# Patient Record
Sex: FEMALE | Race: WHITE | NOT HISPANIC OR LATINO | Employment: OTHER | URBAN - METROPOLITAN AREA
[De-identification: names, ages, dates, MRNs, and addresses within clinical notes are randomized per-mention and may not be internally consistent; named-entity substitution may affect disease eponyms.]

---

## 2023-03-24 DIAGNOSIS — F98.8 ATTENTION DEFICIT DISORDER (ADD) WITHOUT HYPERACTIVITY: Primary | ICD-10-CM

## 2023-03-24 DIAGNOSIS — E28.39 HYPOGONADISM, OVARIAN: ICD-10-CM

## 2023-03-24 RX ORDER — ESTERIFIED ESTROGEN AND METHYLTESTOSTERONE .625; 1.25 MG/1; MG/1
1 TABLET ORAL DAILY
COMMUNITY
End: 2023-03-24 | Stop reason: SDUPTHER

## 2023-03-24 RX ORDER — ESTERIFIED ESTROGEN AND METHYLTESTOSTERONE .625; 1.25 MG/1; MG/1
1 TABLET ORAL DAILY
Qty: 90 TABLET | Refills: 1 | Status: SHIPPED | OUTPATIENT
Start: 2023-03-24 | End: 2023-03-28 | Stop reason: SDUPTHER

## 2023-03-24 RX ORDER — ESCITALOPRAM OXALATE 20 MG/1
20 TABLET ORAL DAILY
COMMUNITY
End: 2023-07-12 | Stop reason: SDUPTHER

## 2023-03-24 RX ORDER — DEXTROAMPHETAMINE SACCHARATE, AMPHETAMINE ASPARTATE, DEXTROAMPHETAMINE SULFATE AND AMPHETAMINE SULFATE 7.5; 7.5; 7.5; 7.5 MG/1; MG/1; MG/1; MG/1
1 TABLET ORAL 2 TIMES DAILY
COMMUNITY
End: 2023-03-24 | Stop reason: SDUPTHER

## 2023-03-24 RX ORDER — METHYLPREDNISOLONE 4 MG/1
TABLET ORAL
COMMUNITY
Start: 2022-08-02 | End: 2023-09-11 | Stop reason: ALTCHOICE

## 2023-03-24 RX ORDER — ATENOLOL 50 MG/1
50 TABLET ORAL 2 TIMES DAILY
COMMUNITY
End: 2023-07-12 | Stop reason: SDUPTHER

## 2023-03-24 RX ORDER — VENLAFAXINE 37.5 MG/1
37.5 TABLET ORAL DAILY
COMMUNITY
End: 2023-09-11 | Stop reason: SDUPTHER

## 2023-03-24 RX ORDER — NAPROXEN 500 MG/1
1 TABLET ORAL 2 TIMES DAILY PRN
COMMUNITY
Start: 2022-12-20 | End: 2023-09-11 | Stop reason: ALTCHOICE

## 2023-03-24 RX ORDER — ZOLPIDEM TARTRATE 12.5 MG/1
12.5 TABLET, FILM COATED, EXTENDED RELEASE ORAL NIGHTLY
COMMUNITY
End: 2023-06-26 | Stop reason: SDUPTHER

## 2023-03-24 RX ORDER — CYCLOBENZAPRINE HCL 5 MG
1 TABLET ORAL 2 TIMES DAILY
COMMUNITY
Start: 2023-01-09 | End: 2023-09-11 | Stop reason: ALTCHOICE

## 2023-03-24 RX ORDER — LEVOTHYROXINE SODIUM 75 UG/1
75 TABLET ORAL DAILY
COMMUNITY
End: 2023-08-23 | Stop reason: SDUPTHER

## 2023-03-24 RX ORDER — DEXTROAMPHETAMINE SACCHARATE, AMPHETAMINE ASPARTATE, DEXTROAMPHETAMINE SULFATE AND AMPHETAMINE SULFATE 7.5; 7.5; 7.5; 7.5 MG/1; MG/1; MG/1; MG/1
1 TABLET ORAL 2 TIMES DAILY
Qty: 60 TABLET | Refills: 0 | Status: SHIPPED | OUTPATIENT
Start: 2023-03-24 | End: 2023-03-28 | Stop reason: SDUPTHER

## 2023-03-24 RX ORDER — MULTIVITAMIN
TABLET ORAL
COMMUNITY

## 2023-03-28 DIAGNOSIS — E28.39 HYPOGONADISM, OVARIAN: ICD-10-CM

## 2023-03-28 DIAGNOSIS — F98.8 ATTENTION DEFICIT DISORDER (ADD) WITHOUT HYPERACTIVITY: ICD-10-CM

## 2023-03-29 RX ORDER — DEXTROAMPHETAMINE SACCHARATE, AMPHETAMINE ASPARTATE, DEXTROAMPHETAMINE SULFATE AND AMPHETAMINE SULFATE 7.5; 7.5; 7.5; 7.5 MG/1; MG/1; MG/1; MG/1
1 TABLET ORAL 2 TIMES DAILY
Qty: 60 TABLET | Refills: 0 | Status: SHIPPED | OUTPATIENT
Start: 2023-03-29 | End: 2023-04-28 | Stop reason: SDUPTHER

## 2023-03-29 RX ORDER — ESTERIFIED ESTROGEN AND METHYLTESTOSTERONE .625; 1.25 MG/1; MG/1
1 TABLET ORAL DAILY
Qty: 90 TABLET | Refills: 1 | Status: SHIPPED | OUTPATIENT
Start: 2023-03-29 | End: 2023-09-11 | Stop reason: SDUPTHER

## 2023-04-28 DIAGNOSIS — F98.8 ATTENTION DEFICIT DISORDER (ADD) WITHOUT HYPERACTIVITY: ICD-10-CM

## 2023-04-28 RX ORDER — DEXTROAMPHETAMINE SACCHARATE, AMPHETAMINE ASPARTATE, DEXTROAMPHETAMINE SULFATE AND AMPHETAMINE SULFATE 7.5; 7.5; 7.5; 7.5 MG/1; MG/1; MG/1; MG/1
1 TABLET ORAL 2 TIMES DAILY
Qty: 60 TABLET | Refills: 0 | Status: SHIPPED | OUTPATIENT
Start: 2023-04-28 | End: 2023-05-30 | Stop reason: SDUPTHER

## 2023-05-30 DIAGNOSIS — F98.8 ATTENTION DEFICIT DISORDER (ADD) WITHOUT HYPERACTIVITY: ICD-10-CM

## 2023-05-30 RX ORDER — DEXTROAMPHETAMINE SACCHARATE, AMPHETAMINE ASPARTATE, DEXTROAMPHETAMINE SULFATE AND AMPHETAMINE SULFATE 7.5; 7.5; 7.5; 7.5 MG/1; MG/1; MG/1; MG/1
1 TABLET ORAL 2 TIMES DAILY
Qty: 60 TABLET | Refills: 0 | Status: SHIPPED | OUTPATIENT
Start: 2023-05-30 | End: 2023-06-30 | Stop reason: SDUPTHER

## 2023-06-26 DIAGNOSIS — F51.01 PRIMARY INSOMNIA: Primary | ICD-10-CM

## 2023-06-26 RX ORDER — ZOLPIDEM TARTRATE 12.5 MG/1
12.5 TABLET, FILM COATED, EXTENDED RELEASE ORAL NIGHTLY
Qty: 90 TABLET | Refills: 0 | Status: SHIPPED | OUTPATIENT
Start: 2023-06-26 | End: 2023-09-11 | Stop reason: SDUPTHER

## 2023-06-30 DIAGNOSIS — F98.8 ATTENTION DEFICIT DISORDER (ADD) WITHOUT HYPERACTIVITY: ICD-10-CM

## 2023-06-30 RX ORDER — DEXTROAMPHETAMINE SACCHARATE, AMPHETAMINE ASPARTATE, DEXTROAMPHETAMINE SULFATE AND AMPHETAMINE SULFATE 7.5; 7.5; 7.5; 7.5 MG/1; MG/1; MG/1; MG/1
1 TABLET ORAL 2 TIMES DAILY
Qty: 60 TABLET | Refills: 0 | Status: SHIPPED | OUTPATIENT
Start: 2023-06-30 | End: 2023-07-28 | Stop reason: SDUPTHER

## 2023-07-12 DIAGNOSIS — R45.86 MOOD CHANGE: Primary | ICD-10-CM

## 2023-07-12 DIAGNOSIS — I10 HYPERTENSION, UNSPECIFIED TYPE: ICD-10-CM

## 2023-07-13 RX ORDER — ATENOLOL 50 MG/1
50 TABLET ORAL 2 TIMES DAILY
Qty: 90 TABLET | Refills: 1 | Status: SHIPPED | OUTPATIENT
Start: 2023-07-13 | End: 2023-09-11 | Stop reason: SDUPTHER

## 2023-07-13 RX ORDER — ESCITALOPRAM OXALATE 20 MG/1
20 TABLET ORAL DAILY
Qty: 90 TABLET | Refills: 1 | Status: SHIPPED | OUTPATIENT
Start: 2023-07-13 | End: 2023-09-11 | Stop reason: SDUPTHER

## 2023-07-28 ENCOUNTER — TELEPHONE (OUTPATIENT)
Dept: PRIMARY CARE | Facility: CLINIC | Age: 68
End: 2023-07-28
Payer: MEDICARE

## 2023-07-28 DIAGNOSIS — F98.8 ATTENTION DEFICIT DISORDER (ADD) WITHOUT HYPERACTIVITY: ICD-10-CM

## 2023-07-31 RX ORDER — DEXTROAMPHETAMINE SACCHARATE, AMPHETAMINE ASPARTATE, DEXTROAMPHETAMINE SULFATE AND AMPHETAMINE SULFATE 7.5; 7.5; 7.5; 7.5 MG/1; MG/1; MG/1; MG/1
1 TABLET ORAL 2 TIMES DAILY
Qty: 60 TABLET | Refills: 0 | Status: SHIPPED | OUTPATIENT
Start: 2023-07-31 | End: 2023-08-29 | Stop reason: SDUPTHER

## 2023-08-02 ENCOUNTER — TELEPHONE (OUTPATIENT)
Dept: PRIMARY CARE | Facility: CLINIC | Age: 68
End: 2023-08-02
Payer: MEDICARE

## 2023-08-23 DIAGNOSIS — E03.9 ACQUIRED HYPOTHYROIDISM: Primary | ICD-10-CM

## 2023-08-23 RX ORDER — LEVOTHYROXINE SODIUM 75 UG/1
75 TABLET ORAL DAILY
Qty: 90 TABLET | Refills: 1 | Status: SHIPPED | OUTPATIENT
Start: 2023-08-23 | End: 2024-03-01

## 2023-08-29 DIAGNOSIS — F98.8 ATTENTION DEFICIT DISORDER (ADD) WITHOUT HYPERACTIVITY: ICD-10-CM

## 2023-08-29 RX ORDER — DEXTROAMPHETAMINE SACCHARATE, AMPHETAMINE ASPARTATE, DEXTROAMPHETAMINE SULFATE AND AMPHETAMINE SULFATE 7.5; 7.5; 7.5; 7.5 MG/1; MG/1; MG/1; MG/1
1 TABLET ORAL 2 TIMES DAILY
Qty: 60 TABLET | Refills: 0 | Status: SHIPPED | OUTPATIENT
Start: 2023-08-29 | End: 2023-09-11 | Stop reason: SDUPTHER

## 2023-09-11 ENCOUNTER — OFFICE VISIT (OUTPATIENT)
Dept: PRIMARY CARE | Facility: CLINIC | Age: 68
End: 2023-09-11
Payer: MEDICARE

## 2023-09-11 VITALS
DIASTOLIC BLOOD PRESSURE: 68 MMHG | OXYGEN SATURATION: 94 % | TEMPERATURE: 97.3 F | WEIGHT: 147.4 LBS | HEIGHT: 66 IN | RESPIRATION RATE: 16 BRPM | HEART RATE: 72 BPM | SYSTOLIC BLOOD PRESSURE: 120 MMHG | BODY MASS INDEX: 23.69 KG/M2

## 2023-09-11 DIAGNOSIS — F51.01 PRIMARY INSOMNIA: ICD-10-CM

## 2023-09-11 DIAGNOSIS — E28.39 HYPOGONADISM, OVARIAN: ICD-10-CM

## 2023-09-11 DIAGNOSIS — Z12.31 ENCOUNTER FOR SCREENING MAMMOGRAM FOR MALIGNANT NEOPLASM OF BREAST: ICD-10-CM

## 2023-09-11 DIAGNOSIS — I10 HYPERTENSION, UNSPECIFIED TYPE: ICD-10-CM

## 2023-09-11 DIAGNOSIS — Z00.00 WELL ADULT EXAM: Primary | ICD-10-CM

## 2023-09-11 DIAGNOSIS — R45.86 MOOD CHANGE: ICD-10-CM

## 2023-09-11 DIAGNOSIS — F98.8 ATTENTION DEFICIT DISORDER (ADD) WITHOUT HYPERACTIVITY: ICD-10-CM

## 2023-09-11 PROCEDURE — 1126F AMNT PAIN NOTED NONE PRSNT: CPT | Performed by: INTERNAL MEDICINE

## 2023-09-11 PROCEDURE — 99213 OFFICE O/P EST LOW 20 MIN: CPT | Performed by: INTERNAL MEDICINE

## 2023-09-11 PROCEDURE — 3078F DIAST BP <80 MM HG: CPT | Performed by: INTERNAL MEDICINE

## 2023-09-11 PROCEDURE — 3074F SYST BP LT 130 MM HG: CPT | Performed by: INTERNAL MEDICINE

## 2023-09-11 PROCEDURE — 1036F TOBACCO NON-USER: CPT | Performed by: INTERNAL MEDICINE

## 2023-09-11 PROCEDURE — 1159F MED LIST DOCD IN RCRD: CPT | Performed by: INTERNAL MEDICINE

## 2023-09-11 RX ORDER — VENLAFAXINE 37.5 MG/1
37.5 TABLET ORAL DAILY
Qty: 90 TABLET | Refills: 1 | Status: SHIPPED | OUTPATIENT
Start: 2023-09-11 | End: 2024-03-18

## 2023-09-11 RX ORDER — ATENOLOL 50 MG/1
50 TABLET ORAL 2 TIMES DAILY
Qty: 90 TABLET | Refills: 1 | Status: SHIPPED | OUTPATIENT
Start: 2023-09-11 | End: 2024-01-16 | Stop reason: SDUPTHER

## 2023-09-11 RX ORDER — ESCITALOPRAM OXALATE 20 MG/1
20 TABLET ORAL DAILY
Qty: 90 TABLET | Refills: 1 | Status: SHIPPED | OUTPATIENT
Start: 2023-09-11 | End: 2024-02-12 | Stop reason: SDUPTHER

## 2023-09-11 RX ORDER — ESTERIFIED ESTROGEN AND METHYLTESTOSTERONE .625; 1.25 MG/1; MG/1
1 TABLET ORAL DAILY
Qty: 90 TABLET | Refills: 1 | Status: SHIPPED | OUTPATIENT
Start: 2023-09-11 | End: 2024-04-03 | Stop reason: SDUPTHER

## 2023-09-11 RX ORDER — DEXTROAMPHETAMINE SACCHARATE, AMPHETAMINE ASPARTATE, DEXTROAMPHETAMINE SULFATE AND AMPHETAMINE SULFATE 7.5; 7.5; 7.5; 7.5 MG/1; MG/1; MG/1; MG/1
1 TABLET ORAL 2 TIMES DAILY
Qty: 60 TABLET | Refills: 0 | Status: SHIPPED | OUTPATIENT
Start: 2023-09-11 | End: 2023-11-06 | Stop reason: SDUPTHER

## 2023-09-11 RX ORDER — ZOLPIDEM TARTRATE 12.5 MG/1
12.5 TABLET, FILM COATED, EXTENDED RELEASE ORAL NIGHTLY
Qty: 90 TABLET | Refills: 0 | Status: SHIPPED | OUTPATIENT
Start: 2023-09-11 | End: 2024-01-08 | Stop reason: SDUPTHER

## 2023-09-11 ASSESSMENT — PAIN SCALES - GENERAL: PAINLEVEL: 0-NO PAIN

## 2023-09-11 ASSESSMENT — ENCOUNTER SYMPTOMS
DEPRESSION: 0
OCCASIONAL FEELINGS OF UNSTEADINESS: 0
LOSS OF SENSATION IN FEET: 0

## 2023-09-11 NOTE — PROGRESS NOTES
Patient is here for a 6 month f/up has no concerns   Needs 6 Rx refills   No Pain   Needs Referral for Mammogram and colonoscopy

## 2023-10-31 ENCOUNTER — ANCILLARY PROCEDURE (OUTPATIENT)
Dept: RADIOLOGY | Facility: CLINIC | Age: 68
End: 2023-10-31
Payer: MEDICARE

## 2023-10-31 DIAGNOSIS — Z12.31 ENCOUNTER FOR SCREENING MAMMOGRAM FOR MALIGNANT NEOPLASM OF BREAST: ICD-10-CM

## 2023-10-31 DIAGNOSIS — Z00.00 WELL ADULT EXAM: ICD-10-CM

## 2023-10-31 PROCEDURE — 77067 SCR MAMMO BI INCL CAD: CPT | Mod: BILATERAL PROCEDURE | Performed by: RADIOLOGY

## 2023-10-31 PROCEDURE — 77063 BREAST TOMOSYNTHESIS BI: CPT

## 2023-10-31 PROCEDURE — 77063 BREAST TOMOSYNTHESIS BI: CPT | Mod: BILATERAL PROCEDURE | Performed by: RADIOLOGY

## 2023-11-06 DIAGNOSIS — F98.8 ATTENTION DEFICIT DISORDER (ADD) WITHOUT HYPERACTIVITY: ICD-10-CM

## 2023-11-06 RX ORDER — DEXTROAMPHETAMINE SACCHARATE, AMPHETAMINE ASPARTATE, DEXTROAMPHETAMINE SULFATE AND AMPHETAMINE SULFATE 7.5; 7.5; 7.5; 7.5 MG/1; MG/1; MG/1; MG/1
1 TABLET ORAL 2 TIMES DAILY
Qty: 60 TABLET | Refills: 0 | Status: SHIPPED | OUTPATIENT
Start: 2023-11-06 | End: 2023-12-04 | Stop reason: SDUPTHER

## 2023-12-04 DIAGNOSIS — F98.8 ATTENTION DEFICIT DISORDER (ADD) WITHOUT HYPERACTIVITY: ICD-10-CM

## 2023-12-04 RX ORDER — DEXTROAMPHETAMINE SACCHARATE, AMPHETAMINE ASPARTATE, DEXTROAMPHETAMINE SULFATE AND AMPHETAMINE SULFATE 7.5; 7.5; 7.5; 7.5 MG/1; MG/1; MG/1; MG/1
1 TABLET ORAL 2 TIMES DAILY
Qty: 60 TABLET | Refills: 0 | Status: SHIPPED | OUTPATIENT
Start: 2023-12-04 | End: 2024-01-08 | Stop reason: SDUPTHER

## 2024-01-08 ENCOUNTER — TELEPHONE (OUTPATIENT)
Dept: PRIMARY CARE | Facility: CLINIC | Age: 69
End: 2024-01-08
Payer: MEDICARE

## 2024-01-08 DIAGNOSIS — F98.8 ATTENTION DEFICIT DISORDER (ADD) WITHOUT HYPERACTIVITY: ICD-10-CM

## 2024-01-08 DIAGNOSIS — F51.01 PRIMARY INSOMNIA: ICD-10-CM

## 2024-01-08 RX ORDER — DEXTROAMPHETAMINE SACCHARATE, AMPHETAMINE ASPARTATE, DEXTROAMPHETAMINE SULFATE AND AMPHETAMINE SULFATE 7.5; 7.5; 7.5; 7.5 MG/1; MG/1; MG/1; MG/1
1 TABLET ORAL 2 TIMES DAILY
Qty: 60 TABLET | Refills: 0 | Status: SHIPPED | OUTPATIENT
Start: 2024-01-08 | End: 2024-01-10 | Stop reason: SDUPTHER

## 2024-01-08 RX ORDER — ZOLPIDEM TARTRATE 12.5 MG/1
12.5 TABLET, FILM COATED, EXTENDED RELEASE ORAL NIGHTLY
Qty: 90 TABLET | Refills: 0 | Status: SHIPPED | OUTPATIENT
Start: 2024-01-08 | End: 2024-01-16 | Stop reason: SDUPTHER

## 2024-01-10 RX ORDER — DEXTROAMPHETAMINE SACCHARATE, AMPHETAMINE ASPARTATE, DEXTROAMPHETAMINE SULFATE AND AMPHETAMINE SULFATE 7.5; 7.5; 7.5; 7.5 MG/1; MG/1; MG/1; MG/1
1 TABLET ORAL 2 TIMES DAILY
Qty: 60 TABLET | Refills: 0 | Status: SHIPPED | OUTPATIENT
Start: 2024-01-10 | End: 2024-01-16 | Stop reason: SDUPTHER

## 2024-01-10 NOTE — TELEPHONE ENCOUNTER
VM:  [facility] does not have her Adderall.  I CB, LVM that she needs to advise us where to resend the Adderall Rx (that she needs to call [facility] to find out who has it) and that she needs to sched an apptmt.

## 2024-01-16 ENCOUNTER — OFFICE VISIT (OUTPATIENT)
Dept: PRIMARY CARE | Facility: CLINIC | Age: 69
End: 2024-01-16
Payer: MEDICARE

## 2024-01-16 VITALS
BODY MASS INDEX: 22.98 KG/M2 | OXYGEN SATURATION: 97 % | HEART RATE: 64 BPM | SYSTOLIC BLOOD PRESSURE: 130 MMHG | RESPIRATION RATE: 16 BRPM | WEIGHT: 143 LBS | TEMPERATURE: 97.3 F | DIASTOLIC BLOOD PRESSURE: 84 MMHG | HEIGHT: 66 IN

## 2024-01-16 DIAGNOSIS — I10 HYPERTENSION, UNSPECIFIED TYPE: ICD-10-CM

## 2024-01-16 DIAGNOSIS — Z51.81 ENCOUNTER FOR THERAPEUTIC DRUG LEVEL MONITORING: ICD-10-CM

## 2024-01-16 DIAGNOSIS — F98.8 ATTENTION DEFICIT DISORDER (ADD) WITHOUT HYPERACTIVITY: ICD-10-CM

## 2024-01-16 DIAGNOSIS — F51.01 PRIMARY INSOMNIA: ICD-10-CM

## 2024-01-16 PROCEDURE — 99213 OFFICE O/P EST LOW 20 MIN: CPT | Performed by: INTERNAL MEDICINE

## 2024-01-16 PROCEDURE — 80368 SEDATIVE HYPNOTICS: CPT

## 2024-01-16 PROCEDURE — 3075F SYST BP GE 130 - 139MM HG: CPT | Performed by: INTERNAL MEDICINE

## 2024-01-16 PROCEDURE — 80373 DRUG SCREENING TRAMADOL: CPT

## 2024-01-16 PROCEDURE — 80365 DRUG SCREENING OXYCODONE: CPT

## 2024-01-16 PROCEDURE — 80354 DRUG SCREENING FENTANYL: CPT

## 2024-01-16 PROCEDURE — 80346 BENZODIAZEPINES1-12: CPT

## 2024-01-16 PROCEDURE — 1159F MED LIST DOCD IN RCRD: CPT | Performed by: INTERNAL MEDICINE

## 2024-01-16 PROCEDURE — 1036F TOBACCO NON-USER: CPT | Performed by: INTERNAL MEDICINE

## 2024-01-16 PROCEDURE — 1126F AMNT PAIN NOTED NONE PRSNT: CPT | Performed by: INTERNAL MEDICINE

## 2024-01-16 PROCEDURE — 3079F DIAST BP 80-89 MM HG: CPT | Performed by: INTERNAL MEDICINE

## 2024-01-16 PROCEDURE — 80361 OPIATES 1 OR MORE: CPT

## 2024-01-16 PROCEDURE — 80358 DRUG SCREENING METHADONE: CPT

## 2024-01-16 RX ORDER — ATENOLOL 50 MG/1
50 TABLET ORAL 2 TIMES DAILY
Qty: 90 TABLET | Refills: 1 | Status: SHIPPED | OUTPATIENT
Start: 2024-01-16 | End: 2024-01-16

## 2024-01-16 RX ORDER — ATENOLOL 50 MG/1
50 TABLET ORAL 2 TIMES DAILY
Qty: 180 TABLET | Refills: 1 | Status: SHIPPED | OUTPATIENT
Start: 2024-01-16

## 2024-01-16 RX ORDER — DEXTROAMPHETAMINE SACCHARATE, AMPHETAMINE ASPARTATE, DEXTROAMPHETAMINE SULFATE AND AMPHETAMINE SULFATE 7.5; 7.5; 7.5; 7.5 MG/1; MG/1; MG/1; MG/1
1 TABLET ORAL 2 TIMES DAILY
Qty: 60 TABLET | Refills: 0 | Status: SHIPPED | OUTPATIENT
Start: 2024-01-16 | End: 2024-02-12 | Stop reason: SDUPTHER

## 2024-01-16 RX ORDER — ZOLPIDEM TARTRATE 12.5 MG/1
12.5 TABLET, FILM COATED, EXTENDED RELEASE ORAL NIGHTLY
Qty: 90 TABLET | Refills: 0 | Status: SHIPPED | OUTPATIENT
Start: 2024-01-16 | End: 2024-03-26 | Stop reason: SDUPTHER

## 2024-01-16 ASSESSMENT — PATIENT HEALTH QUESTIONNAIRE - PHQ9
4. FEELING TIRED OR HAVING LITTLE ENERGY: SEVERAL DAYS
5. POOR APPETITE OR OVEREATING: NOT AT ALL
SUM OF ALL RESPONSES TO PHQ9 QUESTIONS 1 AND 2: 2
9. THOUGHTS THAT YOU WOULD BE BETTER OFF DEAD, OR OF HURTING YOURSELF: NOT AT ALL
SUM OF ALL RESPONSES TO PHQ QUESTIONS 1-9: 4
10. IF YOU CHECKED OFF ANY PROBLEMS, HOW DIFFICULT HAVE THESE PROBLEMS MADE IT FOR YOU TO DO YOUR WORK, TAKE CARE OF THINGS AT HOME, OR GET ALONG WITH OTHER PEOPLE: SOMEWHAT DIFFICULT
6. FEELING BAD ABOUT YOURSELF - OR THAT YOU ARE A FAILURE OR HAVE LET YOURSELF OR YOUR FAMILY DOWN: NOT AT ALL
1. LITTLE INTEREST OR PLEASURE IN DOING THINGS: SEVERAL DAYS
2. FEELING DOWN, DEPRESSED OR HOPELESS: SEVERAL DAYS
3. TROUBLE FALLING OR STAYING ASLEEP OR SLEEPING TOO MUCH: SEVERAL DAYS
7. TROUBLE CONCENTRATING ON THINGS, SUCH AS READING THE NEWSPAPER OR WATCHING TELEVISION: NOT AT ALL
8. MOVING OR SPEAKING SO SLOWLY THAT OTHER PEOPLE COULD HAVE NOTICED. OR THE OPPOSITE, BEING SO FIGETY OR RESTLESS THAT YOU HAVE BEEN MOVING AROUND A LOT MORE THAN USUAL: NOT AT ALL

## 2024-01-16 ASSESSMENT — ANXIETY QUESTIONNAIRES
3. WORRYING TOO MUCH ABOUT DIFFERENT THINGS: NEARLY EVERY DAY
2. NOT BEING ABLE TO STOP OR CONTROL WORRYING: NEARLY EVERY DAY
1. FEELING NERVOUS, ANXIOUS, OR ON EDGE: MORE THAN HALF THE DAYS
IF YOU CHECKED OFF ANY PROBLEMS ON THIS QUESTIONNAIRE, HOW DIFFICULT HAVE THESE PROBLEMS MADE IT FOR YOU TO DO YOUR WORK, TAKE CARE OF THINGS AT HOME, OR GET ALONG WITH OTHER PEOPLE: NOT DIFFICULT AT ALL
5. BEING SO RESTLESS THAT IT IS HARD TO SIT STILL: MORE THAN HALF THE DAYS
6. BECOMING EASILY ANNOYED OR IRRITABLE: SEVERAL DAYS
7. FEELING AFRAID AS IF SOMETHING AWFUL MIGHT HAPPEN: NOT AT ALL
4. TROUBLE RELAXING: NEARLY EVERY DAY
GAD7 TOTAL SCORE: 14

## 2024-01-20 LAB
1OH-MIDAZOLAM UR CFM-MCNC: <25 NG/ML
6MAM UR CFM-MCNC: <25 NG/ML
7AMINOCLONAZEPAM UR CFM-MCNC: <25 NG/ML
A-OH ALPRAZ UR CFM-MCNC: <25 NG/ML
ALPRAZ UR CFM-MCNC: <25 NG/ML
CHLORDIAZEP UR CFM-MCNC: <25 NG/ML
CLONAZEPAM UR CFM-MCNC: <25 NG/ML
CODEINE UR CFM-MCNC: <50 NG/ML
DIAZEPAM UR CFM-MCNC: <25 NG/ML
EDDP UR CFM-MCNC: <25 NG/ML
FENTANYL UR CFM-MCNC: <2.5 NG/ML
HYDROCODONE CTO UR CFM-MCNC: <25 NG/ML
HYDROMORPHONE UR CFM-MCNC: <25 NG/ML
LORAZEPAM UR CFM-MCNC: <25 NG/ML
METHADONE UR CFM-MCNC: <25 NG/ML
MIDAZOLAM UR CFM-MCNC: <25 NG/ML
MORPHINE UR CFM-MCNC: <50 NG/ML
NORDIAZEPAM UR CFM-MCNC: <25 NG/ML
NORFENTANYL UR CFM-MCNC: <2.5 NG/ML
NORHYDROCODONE UR CFM-MCNC: <25 NG/ML
NOROXYCODONE UR CFM-MCNC: <25 NG/ML
NORTRAMADOL UR-MCNC: <50 NG/ML
OXAZEPAM UR CFM-MCNC: <25 NG/ML
OXYCODONE UR CFM-MCNC: <25 NG/ML
OXYMORPHONE UR CFM-MCNC: <25 NG/ML
TEMAZEPAM UR CFM-MCNC: <25 NG/ML
TRAMADOL UR CFM-MCNC: <50 NG/ML
ZOLPIDEM UR CFM-MCNC: >1000 NG/ML
ZOLPIDEM UR-MCNC: 121 NG/ML

## 2024-02-12 DIAGNOSIS — R45.86 MOOD CHANGE: ICD-10-CM

## 2024-02-12 DIAGNOSIS — F98.8 ATTENTION DEFICIT DISORDER (ADD) WITHOUT HYPERACTIVITY: ICD-10-CM

## 2024-02-12 RX ORDER — DEXTROAMPHETAMINE SACCHARATE, AMPHETAMINE ASPARTATE, DEXTROAMPHETAMINE SULFATE AND AMPHETAMINE SULFATE 7.5; 7.5; 7.5; 7.5 MG/1; MG/1; MG/1; MG/1
1 TABLET ORAL 2 TIMES DAILY
Qty: 60 TABLET | Refills: 0 | Status: SHIPPED | OUTPATIENT
Start: 2024-02-12 | End: 2024-03-13 | Stop reason: SDUPTHER

## 2024-02-12 RX ORDER — ESCITALOPRAM OXALATE 20 MG/1
20 TABLET ORAL DAILY
Qty: 90 TABLET | Refills: 1 | Status: SHIPPED | OUTPATIENT
Start: 2024-02-12 | End: 2024-04-03 | Stop reason: SDUPTHER

## 2024-02-29 DIAGNOSIS — E03.9 ACQUIRED HYPOTHYROIDISM: ICD-10-CM

## 2024-03-01 RX ORDER — LEVOTHYROXINE SODIUM 75 UG/1
75 TABLET ORAL DAILY
Qty: 90 TABLET | Refills: 1 | Status: SHIPPED | OUTPATIENT
Start: 2024-03-01

## 2024-03-11 DIAGNOSIS — R05.1 ACUTE COUGH: Primary | ICD-10-CM

## 2024-03-11 RX ORDER — AZITHROMYCIN 250 MG/1
TABLET, FILM COATED ORAL DAILY
COMMUNITY
End: 2024-03-11 | Stop reason: SDUPTHER

## 2024-03-11 RX ORDER — AZITHROMYCIN 250 MG/1
250 TABLET, FILM COATED ORAL DAILY
Qty: 6 TABLET | Refills: 0 | Status: SHIPPED | OUTPATIENT
Start: 2024-03-11

## 2024-03-13 DIAGNOSIS — F98.8 ATTENTION DEFICIT DISORDER (ADD) WITHOUT HYPERACTIVITY: ICD-10-CM

## 2024-03-13 RX ORDER — DEXTROAMPHETAMINE SACCHARATE, AMPHETAMINE ASPARTATE, DEXTROAMPHETAMINE SULFATE AND AMPHETAMINE SULFATE 7.5; 7.5; 7.5; 7.5 MG/1; MG/1; MG/1; MG/1
1 TABLET ORAL 2 TIMES DAILY
Qty: 60 TABLET | Refills: 0 | Status: SHIPPED | OUTPATIENT
Start: 2024-03-13 | End: 2024-04-12 | Stop reason: SDUPTHER

## 2024-03-18 DIAGNOSIS — R45.86 MOOD CHANGE: ICD-10-CM

## 2024-03-18 RX ORDER — VENLAFAXINE 37.5 MG/1
37.5 TABLET ORAL DAILY
Qty: 90 TABLET | Refills: 1 | Status: SHIPPED | OUTPATIENT
Start: 2024-03-18 | End: 2024-04-03 | Stop reason: SDUPTHER

## 2024-03-22 ENCOUNTER — TELEPHONE (OUTPATIENT)
Dept: PRIMARY CARE | Facility: CLINIC | Age: 69
End: 2024-03-22
Payer: MEDICARE

## 2024-03-22 ENCOUNTER — APPOINTMENT (OUTPATIENT)
Dept: RADIOLOGY | Facility: HOSPITAL | Age: 69
End: 2024-03-22
Payer: MEDICARE

## 2024-03-22 ENCOUNTER — APPOINTMENT (OUTPATIENT)
Dept: CARDIOLOGY | Facility: HOSPITAL | Age: 69
End: 2024-03-22
Payer: MEDICARE

## 2024-03-22 ENCOUNTER — HOSPITAL ENCOUNTER (EMERGENCY)
Facility: HOSPITAL | Age: 69
Discharge: HOME | End: 2024-03-22
Attending: EMERGENCY MEDICINE
Payer: MEDICARE

## 2024-03-22 VITALS
HEART RATE: 73 BPM | OXYGEN SATURATION: 98 % | BODY MASS INDEX: 23.38 KG/M2 | RESPIRATION RATE: 16 BRPM | HEIGHT: 66 IN | SYSTOLIC BLOOD PRESSURE: 169 MMHG | DIASTOLIC BLOOD PRESSURE: 72 MMHG | WEIGHT: 145.5 LBS | TEMPERATURE: 97 F

## 2024-03-22 DIAGNOSIS — R42 DIZZINESS: ICD-10-CM

## 2024-03-22 DIAGNOSIS — H61.21 IMPACTED CERUMEN OF RIGHT EAR: ICD-10-CM

## 2024-03-22 DIAGNOSIS — J20.9 BRONCHOSPASM WITH BRONCHITIS, ACUTE: Primary | ICD-10-CM

## 2024-03-22 LAB
ALBUMIN SERPL BCP-MCNC: 4.4 G/DL (ref 3.4–5)
ALP SERPL-CCNC: 67 U/L (ref 33–136)
ALT SERPL W P-5'-P-CCNC: 18 U/L (ref 7–45)
ANION GAP SERPL CALC-SCNC: 14 MMOL/L (ref 10–20)
AST SERPL W P-5'-P-CCNC: 17 U/L (ref 9–39)
ATRIAL RATE: 73 BPM
BASOPHILS # BLD AUTO: 0.05 X10*3/UL (ref 0–0.1)
BASOPHILS NFR BLD AUTO: 0.7 %
BILIRUB SERPL-MCNC: 0.3 MG/DL (ref 0–1.2)
BUN SERPL-MCNC: 22 MG/DL (ref 6–23)
CALCIUM SERPL-MCNC: 10.6 MG/DL (ref 8.6–10.3)
CARDIAC TROPONIN I PNL SERPL HS: 5 NG/L (ref 0–13)
CHLORIDE SERPL-SCNC: 101 MMOL/L (ref 98–107)
CO2 SERPL-SCNC: 28 MMOL/L (ref 21–32)
CREAT SERPL-MCNC: 1.18 MG/DL (ref 0.5–1.05)
EGFRCR SERPLBLD CKD-EPI 2021: 50 ML/MIN/1.73M*2
EOSINOPHIL # BLD AUTO: 0.21 X10*3/UL (ref 0–0.7)
EOSINOPHIL NFR BLD AUTO: 2.8 %
ERYTHROCYTE [DISTWIDTH] IN BLOOD BY AUTOMATED COUNT: 15 % (ref 11.5–14.5)
FLUAV RNA RESP QL NAA+PROBE: NOT DETECTED
FLUBV RNA RESP QL NAA+PROBE: NOT DETECTED
GLUCOSE SERPL-MCNC: 103 MG/DL (ref 74–99)
HCT VFR BLD AUTO: 37.2 % (ref 36–46)
HGB BLD-MCNC: 11.6 G/DL (ref 12–16)
IMM GRANULOCYTES # BLD AUTO: 0.01 X10*3/UL (ref 0–0.7)
IMM GRANULOCYTES NFR BLD AUTO: 0.1 % (ref 0–0.9)
LYMPHOCYTES # BLD AUTO: 1.83 X10*3/UL (ref 1.2–4.8)
LYMPHOCYTES NFR BLD AUTO: 24 %
MCH RBC QN AUTO: 24.6 PG (ref 26–34)
MCHC RBC AUTO-ENTMCNC: 31.2 G/DL (ref 32–36)
MCV RBC AUTO: 79 FL (ref 80–100)
MONOCYTES # BLD AUTO: 0.54 X10*3/UL (ref 0.1–1)
MONOCYTES NFR BLD AUTO: 7.1 %
NEUTROPHILS # BLD AUTO: 4.98 X10*3/UL (ref 1.2–7.7)
NEUTROPHILS NFR BLD AUTO: 65.3 %
NRBC BLD-RTO: ABNORMAL /100{WBCS}
P AXIS: 71 DEGREES
P OFFSET: 215 MS
P ONSET: 153 MS
PLATELET # BLD AUTO: 384 X10*3/UL (ref 150–450)
POTASSIUM SERPL-SCNC: 4.2 MMOL/L (ref 3.5–5.3)
PR INTERVAL: 146 MS
PROT SERPL-MCNC: 7.3 G/DL (ref 6.4–8.2)
Q ONSET: 226 MS
QRS COUNT: 12 BEATS
QRS DURATION: 152 MS
QT INTERVAL: 420 MS
QTC CALCULATION(BAZETT): 462 MS
QTC FREDERICIA: 448 MS
R AXIS: 156 DEGREES
RBC # BLD AUTO: 4.71 X10*6/UL (ref 4–5.2)
RSV RNA RESP QL NAA+PROBE: NOT DETECTED
SARS-COV-2 RNA RESP QL NAA+PROBE: NOT DETECTED
SODIUM SERPL-SCNC: 139 MMOL/L (ref 136–145)
T AXIS: 68 DEGREES
T OFFSET: 436 MS
VENTRICULAR RATE: 73 BPM
WBC # BLD AUTO: 7.6 X10*3/UL (ref 4.4–11.3)

## 2024-03-22 PROCEDURE — 69209 REMOVE IMPACTED EAR WAX UNI: CPT | Mod: RT

## 2024-03-22 PROCEDURE — 36415 COLL VENOUS BLD VENIPUNCTURE: CPT | Performed by: EMERGENCY MEDICINE

## 2024-03-22 PROCEDURE — 85025 COMPLETE CBC W/AUTO DIFF WBC: CPT | Performed by: EMERGENCY MEDICINE

## 2024-03-22 PROCEDURE — 2500000001 HC RX 250 WO HCPCS SELF ADMINISTERED DRUGS (ALT 637 FOR MEDICARE OP): Performed by: EMERGENCY MEDICINE

## 2024-03-22 PROCEDURE — 71046 X-RAY EXAM CHEST 2 VIEWS: CPT

## 2024-03-22 PROCEDURE — 2500000004 HC RX 250 GENERAL PHARMACY W/ HCPCS (ALT 636 FOR OP/ED): Performed by: EMERGENCY MEDICINE

## 2024-03-22 PROCEDURE — 99285 EMERGENCY DEPT VISIT HI MDM: CPT | Mod: 25

## 2024-03-22 PROCEDURE — 96374 THER/PROPH/DIAG INJ IV PUSH: CPT

## 2024-03-22 PROCEDURE — 80053 COMPREHEN METABOLIC PANEL: CPT | Performed by: EMERGENCY MEDICINE

## 2024-03-22 PROCEDURE — 94640 AIRWAY INHALATION TREATMENT: CPT

## 2024-03-22 PROCEDURE — 2500000002 HC RX 250 W HCPCS SELF ADMINISTERED DRUGS (ALT 637 FOR MEDICARE OP, ALT 636 FOR OP/ED): Performed by: EMERGENCY MEDICINE

## 2024-03-22 PROCEDURE — 70450 CT HEAD/BRAIN W/O DYE: CPT | Performed by: RADIOLOGY

## 2024-03-22 PROCEDURE — 70450 CT HEAD/BRAIN W/O DYE: CPT

## 2024-03-22 PROCEDURE — 87637 SARSCOV2&INF A&B&RSV AMP PRB: CPT | Performed by: EMERGENCY MEDICINE

## 2024-03-22 PROCEDURE — 84484 ASSAY OF TROPONIN QUANT: CPT | Performed by: EMERGENCY MEDICINE

## 2024-03-22 PROCEDURE — 71046 X-RAY EXAM CHEST 2 VIEWS: CPT | Performed by: RADIOLOGY

## 2024-03-22 PROCEDURE — 93005 ELECTROCARDIOGRAM TRACING: CPT

## 2024-03-22 RX ORDER — ALBUTEROL SULFATE 90 UG/1
1-2 AEROSOL, METERED RESPIRATORY (INHALATION) EVERY 6 HOURS PRN
Qty: 18 G | Refills: 0 | Status: SHIPPED | OUTPATIENT
Start: 2024-03-22 | End: 2024-04-21

## 2024-03-22 RX ORDER — MECLIZINE HYDROCHLORIDE 25 MG/1
25 TABLET ORAL 3 TIMES DAILY PRN
Qty: 30 TABLET | Refills: 0 | Status: SHIPPED | OUTPATIENT
Start: 2024-03-22 | End: 2024-04-01

## 2024-03-22 RX ORDER — IPRATROPIUM BROMIDE AND ALBUTEROL SULFATE 2.5; .5 MG/3ML; MG/3ML
3 SOLUTION RESPIRATORY (INHALATION) ONCE
Status: COMPLETED | OUTPATIENT
Start: 2024-03-22 | End: 2024-03-22

## 2024-03-22 RX ORDER — MECLIZINE HCL 25MG 25 MG/1
25 TABLET, CHEWABLE ORAL ONCE
Status: COMPLETED | OUTPATIENT
Start: 2024-03-22 | End: 2024-03-22

## 2024-03-22 RX ORDER — PREDNISONE 10 MG/1
50 TABLET ORAL DAILY
Qty: 25 TABLET | Refills: 0 | Status: SHIPPED | OUTPATIENT
Start: 2024-03-23 | End: 2024-03-28

## 2024-03-22 RX ADMIN — IPRATROPIUM BROMIDE AND ALBUTEROL SULFATE 3 ML: 2.5; .5 SOLUTION RESPIRATORY (INHALATION) at 12:16

## 2024-03-22 RX ADMIN — METHYLPREDNISOLONE SODIUM SUCCINATE 125 MG: 125 INJECTION, POWDER, FOR SOLUTION INTRAMUSCULAR; INTRAVENOUS at 12:22

## 2024-03-22 RX ADMIN — MECLIZINE HYDROCHLORIDE 25 MG: 25 TABLET, CHEWABLE ORAL at 12:53

## 2024-03-22 ASSESSMENT — PAIN DESCRIPTION - LOCATION: LOCATION: CHEST

## 2024-03-22 ASSESSMENT — COLUMBIA-SUICIDE SEVERITY RATING SCALE - C-SSRS
6. HAVE YOU EVER DONE ANYTHING, STARTED TO DO ANYTHING, OR PREPARED TO DO ANYTHING TO END YOUR LIFE?: NO
2. HAVE YOU ACTUALLY HAD ANY THOUGHTS OF KILLING YOURSELF?: NO
1. IN THE PAST MONTH, HAVE YOU WISHED YOU WERE DEAD OR WISHED YOU COULD GO TO SLEEP AND NOT WAKE UP?: NO

## 2024-03-22 ASSESSMENT — PAIN SCALES - GENERAL: PAINLEVEL_OUTOF10: 5 - MODERATE PAIN

## 2024-03-22 ASSESSMENT — PAIN DESCRIPTION - PAIN TYPE: TYPE: ACUTE PAIN

## 2024-03-22 ASSESSMENT — PAIN - FUNCTIONAL ASSESSMENT: PAIN_FUNCTIONAL_ASSESSMENT: 0-10

## 2024-03-22 NOTE — ED PROVIDER NOTES
HPI   Chief Complaint   Patient presents with    Chest Pain     Female patient presents to the ED with complaints of left rib pain ongoing for a year made worse with a recent episode of a moist productive cough with yellow sputum and shortness of breath ongoing for 3 weeks. No n/v/d. Pt states she has dizziness and subjective fevers. Home covid test was negative. She states she finished a z-pack recently without change.       HPI     68-year-old female comes emergency room with multiple complaints; 5 days ago she was walking her dog and he pulled and she fell hit her head she comes in today because she is having some dizziness unclear whether she lost consciousness additionally she has had this cough for several weeks she just completed a Z-Adriel; no fever no chills no night no chest pain no chest pressure no shortness of breath               No data recorded                   Patient History   Past Medical History:   Diagnosis Date    Abnormal findings on diagnostic imaging of heart and coronary circulation 2014    Abnormal echocardiogram    Breast cancer (CMS/Formerly [facility])     Effusion, right knee 10/29/2019    Effusion of right knee    Fracture of coccyx, initial encounter for closed fracture (CMS/HCC) 2020    Closed fracture of coccyx, initial encounter    Other hypertrophic disorders of the skin 2020    Skin tag    Personal history of irradiation     Personal history of other diseases of the circulatory system 2020    History of hypertension    Personal history of other diseases of the circulatory system 2015    History of coronary artery disease     Past Surgical History:   Procedure Laterality Date    BREAST LUMPECTOMY      OTHER SURGICAL HISTORY  2020    Knee arthroscopy    OTHER SURGICAL HISTORY  2020     section    OTHER SURGICAL HISTORY  2020    Abdominal surgery    OTHER SURGICAL HISTORY  2020    Facial surgery    OTHER SURGICAL HISTORY  2020     Lumpectomy    OTHER SURGICAL HISTORY  08/27/2020    Breast augmentation    OTHER SURGICAL HISTORY  08/27/2020    Mass excision    OTHER SURGICAL HISTORY  08/27/2020    Hysterectomy vaginal    THYROID SURGERY  01/20/2014    Thyroid Surgery     Family History   Problem Relation Name Age of Onset    Ovarian cancer Mother      Ovarian cancer Sister       Social History     Tobacco Use    Smoking status: Never    Smokeless tobacco: Never   Substance Use Topics    Alcohol use: Yes    Drug use: Not on file     Comment: Ambien       Physical Exam   ED Triage Vitals [03/22/24 1124]   Temperature Heart Rate Respirations BP   36.4 °C (97.5 °F) 83 16 152/84      Pulse Ox Temp Source Heart Rate Source Patient Position   97 % Temporal Monitor Lying      BP Location FiO2 (%)     Left arm --       Physical Exam  Patient alert in no acute distress  HEENT is significant for impacted cerumen right ear  Lungs are significant for bronchospasm in all menezes  Cardiac is regular rate and rhythm  Abdomen is benign  Extremities without cyanosis clubbing erythema or edema  Neuroexam patient is alert and oriented x 3 cranial nerves II through XII are intact there is no nystagmus appreciated DTR/motor/sensory equal intact throughout  ED Course & MDM   Bronchospasm  Impacted cerumen right ear    Medical Decision Making  CT of the head is negative for any acute changes  All labs are within normal limits except creatinine slightly elevated at 1.18  EKG is a sinus rhythm with a right bundle branch block no acute changes appreciate  Chest x-ray is normal  COVID/influenza A/influenza B/RSV are all negative  Impacted cerumen was removed from the right ear; patient was given a DuoNeb treatment and IV Solu-Medrol and the cough has resolved  Neuroexam is completely normal  Suspect she may have inner ear vertigo and will prescribe meclizine  Follow-up PCP    Procedure  Procedures  Impacted cerumen removed from right ear with normal saline irrigation  without difficulty; TM normal     Mulu Stone MD  03/22/24 1254       Mulu Stone MD  03/22/24 1255       Mulu Stone MD  03/22/24 1749       Mulu Stone MD  04/03/24 1013

## 2024-03-22 NOTE — TELEPHONE ENCOUNTER
"Patient called in stating she had a fall last Saturday on 3/16/24 , has been feeling dizzy . She denied going to the hospital at first , but I informed her to go and make sure nothing is wrong , keep her scheduled appt. For Tuesday 3/26 to follow up. She also stated \"its not like dizziness to here she will fall-out but more so the room feels as if its spinning, as if she was drunk\"  "

## 2024-03-25 RX ORDER — MELOXICAM 15 MG/1
TABLET ORAL
COMMUNITY
Start: 2019-10-29

## 2024-03-26 ENCOUNTER — OFFICE VISIT (OUTPATIENT)
Dept: PRIMARY CARE | Facility: CLINIC | Age: 69
End: 2024-03-26
Payer: MEDICARE

## 2024-03-26 VITALS
TEMPERATURE: 97.7 F | WEIGHT: 146.4 LBS | SYSTOLIC BLOOD PRESSURE: 150 MMHG | OXYGEN SATURATION: 94 % | HEART RATE: 77 BPM | RESPIRATION RATE: 16 BRPM | BODY MASS INDEX: 23.53 KG/M2 | DIASTOLIC BLOOD PRESSURE: 76 MMHG | HEIGHT: 66 IN

## 2024-03-26 DIAGNOSIS — H81.10 BENIGN PAROXYSMAL POSITIONAL VERTIGO, UNSPECIFIED LATERALITY: Primary | ICD-10-CM

## 2024-03-26 DIAGNOSIS — F51.01 PRIMARY INSOMNIA: ICD-10-CM

## 2024-03-26 PROCEDURE — 1159F MED LIST DOCD IN RCRD: CPT | Performed by: INTERNAL MEDICINE

## 2024-03-26 PROCEDURE — 1036F TOBACCO NON-USER: CPT | Performed by: INTERNAL MEDICINE

## 2024-03-26 PROCEDURE — 99213 OFFICE O/P EST LOW 20 MIN: CPT | Performed by: INTERNAL MEDICINE

## 2024-03-26 PROCEDURE — 1126F AMNT PAIN NOTED NONE PRSNT: CPT | Performed by: INTERNAL MEDICINE

## 2024-03-26 RX ORDER — ZOLPIDEM TARTRATE 12.5 MG/1
12.5 TABLET, FILM COATED, EXTENDED RELEASE ORAL NIGHTLY
Qty: 90 TABLET | Refills: 0 | Status: SHIPPED | OUTPATIENT
Start: 2024-03-26 | End: 2024-04-03 | Stop reason: SDUPTHER

## 2024-03-26 ASSESSMENT — PAIN SCALES - GENERAL: PAINLEVEL: 0-NO PAIN

## 2024-03-26 ASSESSMENT — ENCOUNTER SYMPTOMS: DIZZINESS: 1

## 2024-03-26 NOTE — PROGRESS NOTES
"Subjective   Patient ID: Katherine Trent \"Kimberly\" is a 68 y.o. female who presents for Dizziness, Nasal Congestion, and Head Injury.  In for follow up possible BPPV    Dizziness    Head Injury         Review of Systems   Neurological:  Positive for dizziness.   All other systems reviewed and are negative.      Objective   Physical Exam  /76   Pulse 77   Temp 36.5 °C (97.7 °F)   Resp 16   Ht 1.676 m (5' 6\")   Wt 66.4 kg (146 lb 6.4 oz)   SpO2 94%   BMI 23.63 kg/m²         Assessment/Plan   Problem List Items Addressed This Visit       Primary insomnia    Relevant Medications    zolpidem CR (Ambien CR) 12.5 mg ER tablet    Benign paroxysmal positional vertigo - Primary    Relevant Orders    Referral to Physical Therapy          "

## 2024-03-26 NOTE — PROGRESS NOTES
Patient is here to discuss some dizziness and bronchitis, she did have a head injury felt dizzy went to ER. CT scan was done at ER  Still expericing Vertigo   Needs Rx refill

## 2024-04-03 DIAGNOSIS — R45.86 MOOD CHANGE: ICD-10-CM

## 2024-04-03 DIAGNOSIS — E28.39 HYPOGONADISM, OVARIAN: ICD-10-CM

## 2024-04-03 DIAGNOSIS — F51.01 PRIMARY INSOMNIA: ICD-10-CM

## 2024-04-03 RX ORDER — ZOLPIDEM TARTRATE 12.5 MG/1
12.5 TABLET, FILM COATED, EXTENDED RELEASE ORAL NIGHTLY
Qty: 90 TABLET | Refills: 0 | Status: SHIPPED | OUTPATIENT
Start: 2024-04-03

## 2024-04-03 RX ORDER — ESTERIFIED ESTROGEN AND METHYLTESTOSTERONE .625; 1.25 MG/1; MG/1
1 TABLET ORAL DAILY
Qty: 90 TABLET | Refills: 1 | Status: SHIPPED | OUTPATIENT
Start: 2024-04-03

## 2024-04-03 RX ORDER — ESCITALOPRAM OXALATE 20 MG/1
20 TABLET ORAL DAILY
Qty: 90 TABLET | Refills: 1 | Status: SHIPPED | OUTPATIENT
Start: 2024-04-03

## 2024-04-03 RX ORDER — VENLAFAXINE 37.5 MG/1
37.5 TABLET ORAL DAILY
Qty: 90 TABLET | Refills: 1 | Status: SHIPPED | OUTPATIENT
Start: 2024-04-03

## 2024-04-12 DIAGNOSIS — F98.8 ATTENTION DEFICIT DISORDER (ADD) WITHOUT HYPERACTIVITY: ICD-10-CM

## 2024-04-13 RX ORDER — DEXTROAMPHETAMINE SACCHARATE, AMPHETAMINE ASPARTATE, DEXTROAMPHETAMINE SULFATE AND AMPHETAMINE SULFATE 7.5; 7.5; 7.5; 7.5 MG/1; MG/1; MG/1; MG/1
1 TABLET ORAL 2 TIMES DAILY
Qty: 60 TABLET | Refills: 0 | Status: SHIPPED | OUTPATIENT
Start: 2024-04-13 | End: 2024-05-09 | Stop reason: SDUPTHER

## 2024-05-09 DIAGNOSIS — F98.8 ATTENTION DEFICIT DISORDER (ADD) WITHOUT HYPERACTIVITY: ICD-10-CM

## 2024-05-10 RX ORDER — DEXTROAMPHETAMINE SACCHARATE, AMPHETAMINE ASPARTATE, DEXTROAMPHETAMINE SULFATE AND AMPHETAMINE SULFATE 7.5; 7.5; 7.5; 7.5 MG/1; MG/1; MG/1; MG/1
1 TABLET ORAL 2 TIMES DAILY
Qty: 60 TABLET | Refills: 0 | Status: SHIPPED | OUTPATIENT
Start: 2024-05-10

## 2024-06-12 DIAGNOSIS — F98.8 ATTENTION DEFICIT DISORDER (ADD) WITHOUT HYPERACTIVITY: ICD-10-CM

## 2024-06-12 RX ORDER — DEXTROAMPHETAMINE SACCHARATE, AMPHETAMINE ASPARTATE, DEXTROAMPHETAMINE SULFATE AND AMPHETAMINE SULFATE 7.5; 7.5; 7.5; 7.5 MG/1; MG/1; MG/1; MG/1
1 TABLET ORAL 2 TIMES DAILY
Qty: 60 TABLET | Refills: 0 | Status: SHIPPED | OUTPATIENT
Start: 2024-06-12

## 2024-07-10 DIAGNOSIS — Z00.00 ENCOUNTER FOR ANNUAL WELLNESS EXAM IN MEDICARE PATIENT: ICD-10-CM

## 2024-07-10 DIAGNOSIS — R79.89 ABNORMAL CBC: ICD-10-CM

## 2024-07-10 DIAGNOSIS — E55.9 VITAMIN D DEFICIENCY: ICD-10-CM

## 2024-07-11 DIAGNOSIS — F98.8 ATTENTION DEFICIT DISORDER (ADD) WITHOUT HYPERACTIVITY: ICD-10-CM

## 2024-07-11 DIAGNOSIS — F51.01 PRIMARY INSOMNIA: ICD-10-CM

## 2024-07-11 RX ORDER — DEXTROAMPHETAMINE SACCHARATE, AMPHETAMINE ASPARTATE, DEXTROAMPHETAMINE SULFATE AND AMPHETAMINE SULFATE 7.5; 7.5; 7.5; 7.5 MG/1; MG/1; MG/1; MG/1
1 TABLET ORAL 2 TIMES DAILY
Qty: 60 TABLET | Refills: 0 | Status: SHIPPED | OUTPATIENT
Start: 2024-07-11

## 2024-07-11 RX ORDER — ZOLPIDEM TARTRATE 12.5 MG/1
12.5 TABLET, FILM COATED, EXTENDED RELEASE ORAL NIGHTLY
Qty: 90 TABLET | Refills: 0 | Status: SHIPPED | OUTPATIENT
Start: 2024-07-11

## 2024-07-23 ENCOUNTER — APPOINTMENT (OUTPATIENT)
Dept: PRIMARY CARE | Facility: CLINIC | Age: 69
End: 2024-07-23
Payer: MEDICARE

## 2024-07-30 ENCOUNTER — APPOINTMENT (OUTPATIENT)
Dept: PRIMARY CARE | Facility: CLINIC | Age: 69
End: 2024-07-30
Payer: MEDICARE

## 2024-07-30 ENCOUNTER — LAB (OUTPATIENT)
Dept: LAB | Facility: LAB | Age: 69
End: 2024-07-30
Payer: MEDICARE

## 2024-07-30 VITALS
HEART RATE: 65 BPM | DIASTOLIC BLOOD PRESSURE: 78 MMHG | BODY MASS INDEX: 24.04 KG/M2 | SYSTOLIC BLOOD PRESSURE: 148 MMHG | HEIGHT: 66 IN | TEMPERATURE: 98 F | RESPIRATION RATE: 16 BRPM | OXYGEN SATURATION: 96 % | WEIGHT: 149.6 LBS

## 2024-07-30 DIAGNOSIS — Z00.00 MEDICARE ANNUAL WELLNESS VISIT, SUBSEQUENT: ICD-10-CM

## 2024-07-30 DIAGNOSIS — Z00.00 ENCOUNTER FOR ANNUAL WELLNESS EXAM IN MEDICARE PATIENT: ICD-10-CM

## 2024-07-30 DIAGNOSIS — R79.89 ABNORMAL CBC: ICD-10-CM

## 2024-07-30 DIAGNOSIS — I10 HYPERTENSION, UNSPECIFIED TYPE: ICD-10-CM

## 2024-07-30 DIAGNOSIS — E55.9 VITAMIN D DEFICIENCY: ICD-10-CM

## 2024-07-30 DIAGNOSIS — F98.8 ATTENTION DEFICIT DISORDER (ADD) WITHOUT HYPERACTIVITY: ICD-10-CM

## 2024-07-30 LAB
25(OH)D3 SERPL-MCNC: 43 NG/ML (ref 30–100)
ALT SERPL W P-5'-P-CCNC: 16 U/L (ref 7–45)
ANION GAP SERPL CALC-SCNC: 16 MMOL/L (ref 10–20)
APPEARANCE UR: ABNORMAL
BILIRUB UR STRIP.AUTO-MCNC: NEGATIVE MG/DL
BUN SERPL-MCNC: 11 MG/DL (ref 6–23)
CALCIUM SERPL-MCNC: 9.5 MG/DL (ref 8.6–10.6)
CHLORIDE SERPL-SCNC: 101 MMOL/L (ref 98–107)
CHOLEST SERPL-MCNC: 299 MG/DL (ref 0–199)
CHOLESTEROL/HDL RATIO: 5.3
CO2 SERPL-SCNC: 29 MMOL/L (ref 21–32)
COLOR UR: YELLOW
CREAT SERPL-MCNC: 1.06 MG/DL (ref 0.5–1.05)
EGFRCR SERPLBLD CKD-EPI 2021: 57 ML/MIN/1.73M*2
ERYTHROCYTE [DISTWIDTH] IN BLOOD BY AUTOMATED COUNT: 17 % (ref 11.5–14.5)
EST. AVERAGE GLUCOSE BLD GHB EST-MCNC: 128 MG/DL
GLUCOSE SERPL-MCNC: 71 MG/DL (ref 74–99)
GLUCOSE UR STRIP.AUTO-MCNC: NORMAL MG/DL
HBA1C MFR BLD: 6.1 %
HCT VFR BLD AUTO: 37.1 % (ref 36–46)
HDLC SERPL-MCNC: 56.8 MG/DL
HGB BLD-MCNC: 11.2 G/DL (ref 12–16)
KETONES UR STRIP.AUTO-MCNC: NEGATIVE MG/DL
LDLC SERPL CALC-MCNC: 181 MG/DL
LEUKOCYTE ESTERASE UR QL STRIP.AUTO: NEGATIVE
MCH RBC QN AUTO: 25.5 PG (ref 26–34)
MCHC RBC AUTO-ENTMCNC: 30.2 G/DL (ref 32–36)
MCV RBC AUTO: 85 FL (ref 80–100)
NITRITE UR QL STRIP.AUTO: NEGATIVE
NON HDL CHOLESTEROL: 242 MG/DL (ref 0–149)
NRBC BLD-RTO: 0 /100 WBCS (ref 0–0)
PH UR STRIP.AUTO: 6 [PH]
PLATELET # BLD AUTO: 368 X10*3/UL (ref 150–450)
POTASSIUM SERPL-SCNC: 5 MMOL/L (ref 3.5–5.3)
PROT UR STRIP.AUTO-MCNC: ABNORMAL MG/DL
RBC # BLD AUTO: 4.39 X10*6/UL (ref 4–5.2)
RBC # UR STRIP.AUTO: NEGATIVE /UL
RBC #/AREA URNS AUTO: NORMAL /HPF
SODIUM SERPL-SCNC: 141 MMOL/L (ref 136–145)
SP GR UR STRIP.AUTO: 1.02
TRIGL SERPL-MCNC: 307 MG/DL (ref 0–149)
TSH SERPL-ACNC: 0.05 MIU/L (ref 0.44–3.98)
UROBILINOGEN UR STRIP.AUTO-MCNC: NORMAL MG/DL
VIT B12 SERPL-MCNC: 570 PG/ML (ref 211–911)
VLDL: 61 MG/DL (ref 0–40)
WBC # BLD AUTO: 7.3 X10*3/UL (ref 4.4–11.3)
WBC #/AREA URNS AUTO: NORMAL /HPF

## 2024-07-30 PROCEDURE — 3077F SYST BP >= 140 MM HG: CPT | Performed by: INTERNAL MEDICINE

## 2024-07-30 PROCEDURE — 1123F ACP DISCUSS/DSCN MKR DOCD: CPT | Performed by: INTERNAL MEDICINE

## 2024-07-30 PROCEDURE — 3078F DIAST BP <80 MM HG: CPT | Performed by: INTERNAL MEDICINE

## 2024-07-30 PROCEDURE — 1125F AMNT PAIN NOTED PAIN PRSNT: CPT | Performed by: INTERNAL MEDICINE

## 2024-07-30 PROCEDURE — 1036F TOBACCO NON-USER: CPT | Performed by: INTERNAL MEDICINE

## 2024-07-30 PROCEDURE — 36415 COLL VENOUS BLD VENIPUNCTURE: CPT

## 2024-07-30 PROCEDURE — 3008F BODY MASS INDEX DOCD: CPT | Performed by: INTERNAL MEDICINE

## 2024-07-30 PROCEDURE — 1170F FXNL STATUS ASSESSED: CPT | Performed by: INTERNAL MEDICINE

## 2024-07-30 PROCEDURE — 99397 PER PM REEVAL EST PAT 65+ YR: CPT | Performed by: INTERNAL MEDICINE

## 2024-07-30 PROCEDURE — G0439 PPPS, SUBSEQ VISIT: HCPCS | Performed by: INTERNAL MEDICINE

## 2024-07-30 PROCEDURE — 1159F MED LIST DOCD IN RCRD: CPT | Performed by: INTERNAL MEDICINE

## 2024-07-30 RX ORDER — ATENOLOL 50 MG/1
50 TABLET ORAL 2 TIMES DAILY
Qty: 180 TABLET | Refills: 1 | Status: SHIPPED | OUTPATIENT
Start: 2024-07-30

## 2024-07-30 RX ORDER — DEXTROAMPHETAMINE SACCHARATE, AMPHETAMINE ASPARTATE, DEXTROAMPHETAMINE SULFATE AND AMPHETAMINE SULFATE 7.5; 7.5; 7.5; 7.5 MG/1; MG/1; MG/1; MG/1
1 TABLET ORAL 2 TIMES DAILY
Qty: 60 TABLET | Refills: 0 | Status: SHIPPED | OUTPATIENT
Start: 2024-08-11

## 2024-07-30 ASSESSMENT — ENCOUNTER SYMPTOMS
DEPRESSION: 0
LOSS OF SENSATION IN FEET: 0
OCCASIONAL FEELINGS OF UNSTEADINESS: 1

## 2024-07-30 ASSESSMENT — PATIENT HEALTH QUESTIONNAIRE - PHQ9
10. IF YOU CHECKED OFF ANY PROBLEMS, HOW DIFFICULT HAVE THESE PROBLEMS MADE IT FOR YOU TO DO YOUR WORK, TAKE CARE OF THINGS AT HOME, OR GET ALONG WITH OTHER PEOPLE: NOT DIFFICULT AT ALL
4. FEELING TIRED OR HAVING LITTLE ENERGY: NOT AT ALL
9. THOUGHTS THAT YOU WOULD BE BETTER OFF DEAD, OR OF HURTING YOURSELF: NOT AT ALL
5. POOR APPETITE OR OVEREATING: NOT AT ALL
1. LITTLE INTEREST OR PLEASURE IN DOING THINGS: NOT AT ALL
6. FEELING BAD ABOUT YOURSELF - OR THAT YOU ARE A FAILURE OR HAVE LET YOURSELF OR YOUR FAMILY DOWN: NOT AT ALL
SUM OF ALL RESPONSES TO PHQ QUESTIONS 1-9: 4
3. TROUBLE FALLING OR STAYING ASLEEP OR SLEEPING TOO MUCH: MORE THAN HALF THE DAYS
SUM OF ALL RESPONSES TO PHQ9 QUESTIONS 1 AND 2: 0
8. MOVING OR SPEAKING SO SLOWLY THAT OTHER PEOPLE COULD HAVE NOTICED. OR THE OPPOSITE, BEING SO FIGETY OR RESTLESS THAT YOU HAVE BEEN MOVING AROUND A LOT MORE THAN USUAL: NOT AT ALL
7. TROUBLE CONCENTRATING ON THINGS, SUCH AS READING THE NEWSPAPER OR WATCHING TELEVISION: MORE THAN HALF THE DAYS
2. FEELING DOWN, DEPRESSED OR HOPELESS: NOT AT ALL

## 2024-07-30 ASSESSMENT — ACTIVITIES OF DAILY LIVING (ADL)
DRESSING: INDEPENDENT
TAKING_MEDICATION: INDEPENDENT
BATHING: INDEPENDENT
MANAGING_FINANCES: INDEPENDENT
DOING_HOUSEWORK: INDEPENDENT
GROCERY_SHOPPING: INDEPENDENT

## 2024-07-30 ASSESSMENT — PAIN SCALES - GENERAL: PAINLEVEL: 3

## 2024-07-30 NOTE — PROGRESS NOTES
"Subjective   Patient ID: Katherine Trent \"Kimberly\" is a 69 y.o. female who presents for Medicare Annual Wellness Visit Subsequent.    Patient in for a visit, on medication for attention deficit disorder. Patient denies having any side effects, no problems with appetite, sleep, and it is effective.          Review of Systems   All other systems reviewed and are negative.      Previous history  Past Medical History:   Diagnosis Date    Abnormal findings on diagnostic imaging of heart and coronary circulation 2014    Abnormal echocardiogram    Breast cancer (Multi)     Effusion, right knee 10/29/2019    Effusion of right knee    Fracture of coccyx, initial encounter for closed fracture (Multi) 2020    Closed fracture of coccyx, initial encounter    Other hypertrophic disorders of the skin 2020    Skin tag    Personal history of irradiation     Personal history of other diseases of the circulatory system 2020    History of hypertension    Personal history of other diseases of the circulatory system 2015    History of coronary artery disease     Past Surgical History:   Procedure Laterality Date    BREAST LUMPECTOMY      OTHER SURGICAL HISTORY  2020    Knee arthroscopy    OTHER SURGICAL HISTORY  2020     section    OTHER SURGICAL HISTORY  2020    Abdominal surgery    OTHER SURGICAL HISTORY  2020    Facial surgery    OTHER SURGICAL HISTORY  2020    Lumpectomy    OTHER SURGICAL HISTORY  2020    Breast augmentation    OTHER SURGICAL HISTORY  2020    Mass excision    OTHER SURGICAL HISTORY  2020    Hysterectomy vaginal    THYROID SURGERY  2014    Thyroid Surgery     Social History     Tobacco Use    Smoking status: Never    Smokeless tobacco: Never   Substance Use Topics    Alcohol use: Yes     Family History   Problem Relation Name Age of Onset    Ovarian cancer Mother      Ovarian cancer Sister       No Known Allergies  Current " "Outpatient Medications   Medication Instructions    albuterol 90 mcg/actuation inhaler 1-2 puffs, inhalation, Every 6 hours PRN    [START ON 8/11/2024] amphetamine-dextroamphetamine (Adderall) 30 mg tablet 30 mg, oral, 2 times daily    atenolol (TENORMIN) 50 mg, oral, 2 times daily    azithromycin (ZITHROMAX) 250 mg, oral, Daily, Take as directed. Take 2 tablets by mouth the first day and then once daily until complete .    escitalopram (LEXAPRO) 20 mg, oral, Daily    estrogens-methyltestosterone (Estratest HS) 0.625-1.25 mg tablet 1 tablet, oral, Daily    hyaluronan (Orthovisc) 30 mg/2 mL injection intra-articular    levothyroxine (SYNTHROID, LEVOXYL) 75 mcg, oral, Daily    meloxicam (Mobic) 15 mg tablet oral, Daily RT    multivitamin (Daily Multi-Vitamin) tablet oral    venlafaxine (EFFEXOR) 37.5 mg, oral, Daily    zolpidem CR (AMBIEN CR) 12.5 mg, oral, Nightly       Objective       Physical Exam  Constitutional:       Appearance: Normal appearance.   HENT:      Head: Normocephalic.   Eyes:      Extraocular Movements: Extraocular movements intact.      Conjunctiva/sclera: Conjunctivae normal.      Pupils: Pupils are equal, round, and reactive to light.   Cardiovascular:      Rate and Rhythm: Normal rate and regular rhythm.   Pulmonary:      Effort: Pulmonary effort is normal.      Breath sounds: Normal breath sounds.   Abdominal:      General: Abdomen is flat. Bowel sounds are normal.      Palpations: Abdomen is soft.   Musculoskeletal:         General: Normal range of motion.      Cervical back: Normal range of motion.   Skin:     General: Skin is warm and dry.   Neurological:      General: No focal deficit present.      Mental Status: She is alert and oriented to person, place, and time. Mental status is at baseline.   Psychiatric:         Mood and Affect: Mood normal.         Behavior: Behavior normal.           Assessment/Plan   Katherine Trent \"Kimberly\" is a 69 y.o. female who presents for the concerns " below:    Problem List Items Addressed This Visit    None  Visit Diagnoses       Hypertension, unspecified type        Relevant Medications    atenolol (Tenormin) 50 mg tablet    Attention deficit disorder (ADD) without hyperactivity        Relevant Medications    amphetamine-dextroamphetamine (Adderall) 30 mg tablet (Start on 8/11/2024)                 Discussed with:   Return in :    Portions of this note were generated using digital voice recognition software, and may contain grammatical errors       Presley Eugene MD  07/30/24  8:48 AM

## 2024-07-30 NOTE — PROGRESS NOTES
Patient is here for annual medicare wellness exam , wanted to discuss possible hip and knee replacement   NKA  Pain level 3/10 - hips and knee  Need 1 Rx RF

## 2024-08-12 DIAGNOSIS — F98.8 ATTENTION DEFICIT DISORDER (ADD) WITHOUT HYPERACTIVITY: ICD-10-CM

## 2024-08-13 RX ORDER — DEXTROAMPHETAMINE SACCHARATE, AMPHETAMINE ASPARTATE, DEXTROAMPHETAMINE SULFATE AND AMPHETAMINE SULFATE 7.5; 7.5; 7.5; 7.5 MG/1; MG/1; MG/1; MG/1
1 TABLET ORAL 2 TIMES DAILY
Qty: 60 TABLET | Refills: 0 | Status: SHIPPED | OUTPATIENT
Start: 2024-08-13

## 2024-09-12 DIAGNOSIS — F98.8 ATTENTION DEFICIT DISORDER (ADD) WITHOUT HYPERACTIVITY: ICD-10-CM

## 2024-09-12 RX ORDER — DEXTROAMPHETAMINE SACCHARATE, AMPHETAMINE ASPARTATE, DEXTROAMPHETAMINE SULFATE AND AMPHETAMINE SULFATE 7.5; 7.5; 7.5; 7.5 MG/1; MG/1; MG/1; MG/1
1 TABLET ORAL 2 TIMES DAILY
Qty: 60 TABLET | Refills: 0 | Status: SHIPPED | OUTPATIENT
Start: 2024-09-12

## 2024-09-15 DIAGNOSIS — E03.9 ACQUIRED HYPOTHYROIDISM: ICD-10-CM

## 2024-09-16 RX ORDER — LEVOTHYROXINE SODIUM 75 UG/1
75 TABLET ORAL DAILY
Qty: 90 TABLET | Refills: 1 | Status: SHIPPED | OUTPATIENT
Start: 2024-09-16

## 2024-09-30 DIAGNOSIS — R45.86 MOOD CHANGE: ICD-10-CM

## 2024-09-30 DIAGNOSIS — F51.01 PRIMARY INSOMNIA: ICD-10-CM

## 2024-09-30 NOTE — TELEPHONE ENCOUNTER
escitalopram (Lexapro) 20 mg tablet   venlafaxine (Effexor) 37.5 mg tablet   zolpidem CR (Ambien CR) 12.5 mg ER tablet     Naun Rankin

## 2024-10-02 RX ORDER — ESCITALOPRAM OXALATE 20 MG/1
20 TABLET ORAL DAILY
Qty: 90 TABLET | Refills: 1 | Status: SHIPPED | OUTPATIENT
Start: 2024-10-02

## 2024-10-02 RX ORDER — ZOLPIDEM TARTRATE 12.5 MG/1
12.5 TABLET, FILM COATED, EXTENDED RELEASE ORAL NIGHTLY
Qty: 90 TABLET | Refills: 0 | Status: SHIPPED | OUTPATIENT
Start: 2024-10-02

## 2024-10-02 RX ORDER — VENLAFAXINE 37.5 MG/1
37.5 TABLET ORAL DAILY
Qty: 90 TABLET | Refills: 1 | Status: SHIPPED | OUTPATIENT
Start: 2024-10-02

## 2024-10-03 ENCOUNTER — TELEPHONE (OUTPATIENT)
Dept: PRIMARY CARE | Facility: CLINIC | Age: 69
End: 2024-10-03
Payer: MEDICARE

## 2024-10-03 DIAGNOSIS — F98.8 ATTENTION DEFICIT DISORDER (ADD) WITHOUT HYPERACTIVITY: ICD-10-CM

## 2024-10-03 NOTE — TELEPHONE ENCOUNTER
Would like Rx for UTI to  [facility]  3days urgency/frequency to urinate & does match output, irritated, cloudy urine, no burning or itching.

## 2024-10-11 RX ORDER — DEXTROAMPHETAMINE SACCHARATE, AMPHETAMINE ASPARTATE, DEXTROAMPHETAMINE SULFATE AND AMPHETAMINE SULFATE 7.5; 7.5; 7.5; 7.5 MG/1; MG/1; MG/1; MG/1
1 TABLET ORAL 2 TIMES DAILY
Qty: 60 TABLET | Refills: 0 | Status: SHIPPED | OUTPATIENT
Start: 2024-10-11

## 2024-10-17 DIAGNOSIS — E28.39 HYPOGONADISM, OVARIAN: ICD-10-CM

## 2024-10-17 RX ORDER — ESTERIFIED ESTROGEN AND METHYLTESTOSTERONE .625; 1.25 MG/1; MG/1
1 TABLET ORAL DAILY
Qty: 90 TABLET | Refills: 2 | Status: SHIPPED | OUTPATIENT
Start: 2024-10-17

## 2024-11-11 DIAGNOSIS — F98.8 ATTENTION DEFICIT DISORDER (ADD) WITHOUT HYPERACTIVITY: ICD-10-CM

## 2024-11-11 RX ORDER — DEXTROAMPHETAMINE SACCHARATE, AMPHETAMINE ASPARTATE, DEXTROAMPHETAMINE SULFATE AND AMPHETAMINE SULFATE 7.5; 7.5; 7.5; 7.5 MG/1; MG/1; MG/1; MG/1
1 TABLET ORAL 2 TIMES DAILY
Qty: 60 TABLET | Refills: 0 | Status: SHIPPED | OUTPATIENT
Start: 2024-11-11

## 2024-11-14 ENCOUNTER — APPOINTMENT (OUTPATIENT)
Dept: PRIMARY CARE | Facility: CLINIC | Age: 69
End: 2024-11-14
Payer: MEDICARE

## 2024-11-15 ENCOUNTER — OFFICE VISIT (OUTPATIENT)
Dept: PRIMARY CARE | Facility: CLINIC | Age: 69
End: 2024-11-15
Payer: MEDICARE

## 2024-11-15 ENCOUNTER — APPOINTMENT (OUTPATIENT)
Dept: PRIMARY CARE | Facility: CLINIC | Age: 69
End: 2024-11-15
Payer: MEDICARE

## 2024-11-15 VITALS
HEART RATE: 60 BPM | DIASTOLIC BLOOD PRESSURE: 70 MMHG | OXYGEN SATURATION: 94 % | SYSTOLIC BLOOD PRESSURE: 130 MMHG | TEMPERATURE: 97.7 F | RESPIRATION RATE: 16 BRPM | HEIGHT: 66 IN | WEIGHT: 147 LBS | BODY MASS INDEX: 23.63 KG/M2

## 2024-11-15 DIAGNOSIS — E28.39 HYPOGONADISM, OVARIAN: ICD-10-CM

## 2024-11-15 DIAGNOSIS — N39.0 URINARY TRACT INFECTION WITHOUT HEMATURIA, SITE UNSPECIFIED: ICD-10-CM

## 2024-11-15 DIAGNOSIS — E03.9 ACQUIRED HYPOTHYROIDISM: ICD-10-CM

## 2024-11-15 DIAGNOSIS — J06.9 VIRAL URI WITH COUGH: Primary | ICD-10-CM

## 2024-11-15 LAB
POC APPEARANCE, URINE: CLEAR
POC BILIRUBIN, URINE: ABNORMAL
POC BLOOD, URINE: NEGATIVE
POC COLOR, URINE: ABNORMAL
POC GLUCOSE, URINE: NEGATIVE MG/DL
POC KETONES, URINE: ABNORMAL MG/DL
POC LEUKOCYTES, URINE: ABNORMAL
POC NITRITE,URINE: NEGATIVE
POC PH, URINE: 7 PH
POC PROTEIN, URINE: ABNORMAL MG/DL
POC SPECIFIC GRAVITY, URINE: 1.02
POC UROBILINOGEN, URINE: 0.2 EU/DL

## 2024-11-15 PROCEDURE — 81003 URINALYSIS AUTO W/O SCOPE: CPT | Performed by: INTERNAL MEDICINE

## 2024-11-15 PROCEDURE — 1159F MED LIST DOCD IN RCRD: CPT | Performed by: INTERNAL MEDICINE

## 2024-11-15 PROCEDURE — 1036F TOBACCO NON-USER: CPT | Performed by: INTERNAL MEDICINE

## 2024-11-15 PROCEDURE — 99213 OFFICE O/P EST LOW 20 MIN: CPT | Performed by: INTERNAL MEDICINE

## 2024-11-15 PROCEDURE — 3008F BODY MASS INDEX DOCD: CPT | Performed by: INTERNAL MEDICINE

## 2024-11-15 PROCEDURE — 1123F ACP DISCUSS/DSCN MKR DOCD: CPT | Performed by: INTERNAL MEDICINE

## 2024-11-15 RX ORDER — LEVOTHYROXINE SODIUM 75 UG/1
75 TABLET ORAL DAILY
Qty: 90 TABLET | Refills: 3 | Status: SHIPPED | OUTPATIENT
Start: 2024-11-15

## 2024-11-15 RX ORDER — ESTERIFIED ESTROGEN AND METHYLTESTOSTERONE .625; 1.25 MG/1; MG/1
1 TABLET ORAL DAILY
Qty: 90 TABLET | Refills: 2 | Status: SHIPPED | OUTPATIENT
Start: 2024-11-15

## 2024-11-15 RX ORDER — OSELTAMIVIR PHOSPHATE 75 MG/1
75 CAPSULE ORAL 2 TIMES DAILY
Qty: 10 CAPSULE | Refills: 0 | Status: SHIPPED | OUTPATIENT
Start: 2024-11-15 | End: 2024-11-20

## 2024-11-15 ASSESSMENT — ENCOUNTER SYMPTOMS: COUGH: 1

## 2024-11-15 NOTE — PROGRESS NOTES
"Subjective   Patient ID: Katherine Trent \"Kimberly\" is a 69 y.o. female who presents for Sinusitis, Cough, and UTI.      In for follow up for cough     Sinusitis  Associated symptoms include coughing.   Cough    UTI         Review of Systems   Respiratory:  Positive for cough.    All other systems reviewed and are negative.      Previous history  Past Medical History:   Diagnosis Date    Abnormal findings on diagnostic imaging of heart and coronary circulation 2014    Abnormal echocardiogram    Breast cancer (Multi)     Effusion, right knee 10/29/2019    Effusion of right knee    Fracture of coccyx, initial encounter for closed fracture (Multi) 2020    Closed fracture of coccyx, initial encounter    Other hypertrophic disorders of the skin 2020    Skin tag    Personal history of irradiation     Personal history of other diseases of the circulatory system 2020    History of hypertension    Personal history of other diseases of the circulatory system 2015    History of coronary artery disease     Past Surgical History:   Procedure Laterality Date    BREAST LUMPECTOMY      OTHER SURGICAL HISTORY  2020    Knee arthroscopy    OTHER SURGICAL HISTORY  2020     section    OTHER SURGICAL HISTORY  2020    Abdominal surgery    OTHER SURGICAL HISTORY  2020    Facial surgery    OTHER SURGICAL HISTORY  2020    Lumpectomy    OTHER SURGICAL HISTORY  2020    Breast augmentation    OTHER SURGICAL HISTORY  2020    Mass excision    OTHER SURGICAL HISTORY  2020    Hysterectomy vaginal    THYROID SURGERY  2014    Thyroid Surgery     Social History     Tobacco Use    Smoking status: Never    Smokeless tobacco: Never   Substance Use Topics    Alcohol use: Yes     Family History   Problem Relation Name Age of Onset    Ovarian cancer Mother      Ovarian cancer Sister       No Known Allergies  Current Outpatient Medications   Medication Instructions " "   albuterol 90 mcg/actuation inhaler 1-2 puffs, inhalation, Every 6 hours PRN    amphetamine-dextroamphetamine (Adderall) 30 mg tablet 30 mg, oral, 2 times daily    atenolol (TENORMIN) 50 mg, oral, 2 times daily    azithromycin (ZITHROMAX) 250 mg, oral, Daily, Take as directed. Take 2 tablets by mouth the first day and then once daily until complete .    escitalopram (LEXAPRO) 20 mg, oral, Daily    estrogens-methyltestosterone (Estratest HS) 0.625-1.25 mg tablet 1 tablet, oral, Daily    hyaluronan (Orthovisc) 30 mg/2 mL injection intra-articular    levothyroxine (SYNTHROID, LEVOXYL) 75 mcg, oral, Daily    meloxicam (Mobic) 15 mg tablet oral, Daily RT    multivitamin (Daily Multi-Vitamin) tablet oral    oseltamivir (TAMIFLU) 75 mg, oral, 2 times daily    venlafaxine (EFFEXOR) 37.5 mg, oral, Daily    zolpidem CR (AMBIEN CR) 12.5 mg, oral, Nightly       Objective       Physical Exam      Assessment/Plan   Katherine Trent \"Kimberly\" is a 69 y.o. female who presents for the concerns below:    Problem List Items Addressed This Visit    None  Visit Diagnoses       Viral URI with cough    -  Primary    Relevant Medications    oseltamivir (Tamiflu) 75 mg capsule    Urinary tract infection without hematuria, site unspecified        Relevant Orders    POCT UA Automated manually resulted (Completed)    Hypogonadism, ovarian        Relevant Medications    estrogens-methyltestosterone (Estratest HS) 0.625-1.25 mg tablet                 Discussed with:   Return in :    Portions of this note were generated using digital voice recognition software, and may contain grammatical errors       Presley Eugene MD  11/15/24  10:46 AM    "

## 2024-11-15 NOTE — TELEPHONE ENCOUNTER
Pt req cancel refills at [facility] and move:  levothyroxine (Synthroid, Levoxyl) 75 mcg tablet to [facility]

## 2024-12-02 ENCOUNTER — APPOINTMENT (OUTPATIENT)
Dept: RADIOLOGY | Facility: HOSPITAL | Age: 69
End: 2024-12-02
Payer: MEDICARE

## 2024-12-02 ENCOUNTER — HOSPITAL ENCOUNTER (INPATIENT)
Facility: HOSPITAL | Age: 69
LOS: 1 days | Discharge: HOME | End: 2024-12-05
Attending: STUDENT IN AN ORGANIZED HEALTH CARE EDUCATION/TRAINING PROGRAM | Admitting: STUDENT IN AN ORGANIZED HEALTH CARE EDUCATION/TRAINING PROGRAM
Payer: MEDICARE

## 2024-12-02 ENCOUNTER — APPOINTMENT (OUTPATIENT)
Dept: CARDIOLOGY | Facility: HOSPITAL | Age: 69
End: 2024-12-02
Payer: MEDICARE

## 2024-12-02 DIAGNOSIS — L57.0 ACTINIC KERATOSIS: ICD-10-CM

## 2024-12-02 DIAGNOSIS — I25.10 ARTERIOSCLEROSIS OF CORONARY ARTERY: ICD-10-CM

## 2024-12-02 DIAGNOSIS — F32.A ACUTE DEPRESSION: ICD-10-CM

## 2024-12-02 DIAGNOSIS — G47.00 INSOMNIA, UNSPECIFIED TYPE: ICD-10-CM

## 2024-12-02 DIAGNOSIS — K26.4 GASTROINTESTINAL HEMORRHAGE ASSOCIATED WITH DUODENAL ULCER: ICD-10-CM

## 2024-12-02 DIAGNOSIS — K92.1 GASTROINTESTINAL HEMORRHAGE WITH MELENA: ICD-10-CM

## 2024-12-02 DIAGNOSIS — M54.16 LUMBAR RADICULOPATHY, CHRONIC: ICD-10-CM

## 2024-12-02 DIAGNOSIS — K92.2 GASTROINTESTINAL HEMORRHAGE, UNSPECIFIED GASTROINTESTINAL HEMORRHAGE TYPE: Primary | ICD-10-CM

## 2024-12-02 DIAGNOSIS — F90.0 ATTENTION DEFICIT HYPERACTIVITY DISORDER (ADHD), PREDOMINANTLY INATTENTIVE TYPE: ICD-10-CM

## 2024-12-02 DIAGNOSIS — D64.9 ANEMIA, UNSPECIFIED TYPE: ICD-10-CM

## 2024-12-02 LAB
ALBUMIN SERPL BCP-MCNC: 3.9 G/DL (ref 3.4–5)
ALP SERPL-CCNC: 39 U/L (ref 33–136)
ALT SERPL W P-5'-P-CCNC: 16 U/L (ref 7–45)
ANION GAP SERPL CALC-SCNC: 14 MMOL/L (ref 10–20)
APPEARANCE UR: ABNORMAL
AST SERPL W P-5'-P-CCNC: 19 U/L (ref 9–39)
BASOPHILS # BLD AUTO: 0.05 X10*3/UL (ref 0–0.1)
BASOPHILS NFR BLD AUTO: 0.5 %
BILIRUB SERPL-MCNC: 0.2 MG/DL (ref 0–1.2)
BILIRUB UR STRIP.AUTO-MCNC: NEGATIVE MG/DL
BUN SERPL-MCNC: 37 MG/DL (ref 6–23)
CALCIUM SERPL-MCNC: 8.9 MG/DL (ref 8.6–10.3)
CHLORIDE SERPL-SCNC: 101 MMOL/L (ref 98–107)
CO2 SERPL-SCNC: 29 MMOL/L (ref 21–32)
COLOR UR: ABNORMAL
CREAT SERPL-MCNC: 1 MG/DL (ref 0.5–1.05)
EGFRCR SERPLBLD CKD-EPI 2021: 61 ML/MIN/1.73M*2
EOSINOPHIL # BLD AUTO: 0.23 X10*3/UL (ref 0–0.7)
EOSINOPHIL NFR BLD AUTO: 2.4 %
ERYTHROCYTE [DISTWIDTH] IN BLOOD BY AUTOMATED COUNT: 15 % (ref 11.5–14.5)
ERYTHROCYTE [DISTWIDTH] IN BLOOD BY AUTOMATED COUNT: 15.2 % (ref 11.5–14.5)
FERRITIN SERPL-MCNC: 22 NG/ML (ref 8–150)
GLUCOSE SERPL-MCNC: 94 MG/DL (ref 74–99)
GLUCOSE UR STRIP.AUTO-MCNC: NORMAL MG/DL
HCT VFR BLD AUTO: 25.2 % (ref 36–46)
HCT VFR BLD AUTO: 26.5 % (ref 36–46)
HGB BLD-MCNC: 7.5 G/DL (ref 12–16)
HGB BLD-MCNC: 8.1 G/DL (ref 12–16)
HGB RETIC QN: 33 PG (ref 28–38)
HOLD SPECIMEN: NORMAL
IMM GRANULOCYTES # BLD AUTO: 0.04 X10*3/UL (ref 0–0.7)
IMM GRANULOCYTES NFR BLD AUTO: 0.4 % (ref 0–0.9)
IMMATURE RETIC FRACTION: 31.1 %
INR PPP: 1 (ref 0.9–1.1)
IRON SATN MFR SERPL: 17 % (ref 25–45)
IRON SERPL-MCNC: 65 UG/DL (ref 35–150)
KETONES UR STRIP.AUTO-MCNC: NEGATIVE MG/DL
LEUKOCYTE ESTERASE UR QL STRIP.AUTO: NEGATIVE
LIPASE SERPL-CCNC: 49 U/L (ref 9–82)
LYMPHOCYTES # BLD AUTO: 2.75 X10*3/UL (ref 1.2–4.8)
LYMPHOCYTES NFR BLD AUTO: 28.5 %
MAGNESIUM SERPL-MCNC: 1.78 MG/DL (ref 1.6–2.4)
MCH RBC QN AUTO: 26 PG (ref 26–34)
MCH RBC QN AUTO: 26.2 PG (ref 26–34)
MCHC RBC AUTO-ENTMCNC: 29.8 G/DL (ref 32–36)
MCHC RBC AUTO-ENTMCNC: 30.6 G/DL (ref 32–36)
MCV RBC AUTO: 86 FL (ref 80–100)
MCV RBC AUTO: 87 FL (ref 80–100)
MONOCYTES # BLD AUTO: 0.84 X10*3/UL (ref 0.1–1)
MONOCYTES NFR BLD AUTO: 8.7 %
NEUTROPHILS # BLD AUTO: 5.73 X10*3/UL (ref 1.2–7.7)
NEUTROPHILS NFR BLD AUTO: 59.5 %
NITRITE UR QL STRIP.AUTO: NEGATIVE
NRBC BLD-RTO: 0 /100 WBCS (ref 0–0)
NRBC BLD-RTO: 0 /100 WBCS (ref 0–0)
PH UR STRIP.AUTO: 7 [PH]
PLATELET # BLD AUTO: 235 X10*3/UL (ref 150–450)
PLATELET # BLD AUTO: 306 X10*3/UL (ref 150–450)
POTASSIUM SERPL-SCNC: 4.2 MMOL/L (ref 3.5–5.3)
PROT SERPL-MCNC: 6.3 G/DL (ref 6.4–8.2)
PROT UR STRIP.AUTO-MCNC: NEGATIVE MG/DL
PROTHROMBIN TIME: 10.8 SECONDS (ref 9.8–12.8)
RBC # BLD AUTO: 2.89 X10*6/UL (ref 4–5.2)
RBC # BLD AUTO: 3.09 X10*6/UL (ref 4–5.2)
RBC # UR STRIP.AUTO: NEGATIVE /UL
RETICS #: 0.06 X10*6/UL (ref 0.02–0.11)
RETICS/RBC NFR AUTO: 1.9 % (ref 0.5–2)
SODIUM SERPL-SCNC: 140 MMOL/L (ref 136–145)
SP GR UR STRIP.AUTO: 1.02
TIBC SERPL-MCNC: 373 UG/DL (ref 240–445)
TSH SERPL-ACNC: 0.45 MIU/L (ref 0.44–3.98)
UIBC SERPL-MCNC: 308 UG/DL (ref 110–370)
UROBILINOGEN UR STRIP.AUTO-MCNC: NORMAL MG/DL
WBC # BLD AUTO: 8 X10*3/UL (ref 4.4–11.3)
WBC # BLD AUTO: 9.6 X10*3/UL (ref 4.4–11.3)

## 2024-12-02 PROCEDURE — 83690 ASSAY OF LIPASE: CPT | Performed by: STUDENT IN AN ORGANIZED HEALTH CARE EDUCATION/TRAINING PROGRAM

## 2024-12-02 PROCEDURE — 36415 COLL VENOUS BLD VENIPUNCTURE: CPT | Performed by: INTERNAL MEDICINE

## 2024-12-02 PROCEDURE — 93005 ELECTROCARDIOGRAM TRACING: CPT

## 2024-12-02 PROCEDURE — 36415 COLL VENOUS BLD VENIPUNCTURE: CPT | Performed by: STUDENT IN AN ORGANIZED HEALTH CARE EDUCATION/TRAINING PROGRAM

## 2024-12-02 PROCEDURE — 83550 IRON BINDING TEST: CPT | Performed by: INTERNAL MEDICINE

## 2024-12-02 PROCEDURE — 2550000001 HC RX 255 CONTRASTS: Performed by: STUDENT IN AN ORGANIZED HEALTH CARE EDUCATION/TRAINING PROGRAM

## 2024-12-02 PROCEDURE — 96374 THER/PROPH/DIAG INJ IV PUSH: CPT

## 2024-12-02 PROCEDURE — 82565 ASSAY OF CREATININE: CPT | Performed by: STUDENT IN AN ORGANIZED HEALTH CARE EDUCATION/TRAINING PROGRAM

## 2024-12-02 PROCEDURE — 83735 ASSAY OF MAGNESIUM: CPT | Performed by: STUDENT IN AN ORGANIZED HEALTH CARE EDUCATION/TRAINING PROGRAM

## 2024-12-02 PROCEDURE — 82607 VITAMIN B-12: CPT | Mod: GEALAB | Performed by: INTERNAL MEDICINE

## 2024-12-02 PROCEDURE — 85045 AUTOMATED RETICULOCYTE COUNT: CPT | Performed by: INTERNAL MEDICINE

## 2024-12-02 PROCEDURE — G0378 HOSPITAL OBSERVATION PER HR: HCPCS

## 2024-12-02 PROCEDURE — 2500000004 HC RX 250 GENERAL PHARMACY W/ HCPCS (ALT 636 FOR OP/ED)

## 2024-12-02 PROCEDURE — 84075 ASSAY ALKALINE PHOSPHATASE: CPT | Performed by: STUDENT IN AN ORGANIZED HEALTH CARE EDUCATION/TRAINING PROGRAM

## 2024-12-02 PROCEDURE — 85025 COMPLETE CBC W/AUTO DIFF WBC: CPT | Performed by: STUDENT IN AN ORGANIZED HEALTH CARE EDUCATION/TRAINING PROGRAM

## 2024-12-02 PROCEDURE — 82728 ASSAY OF FERRITIN: CPT | Performed by: INTERNAL MEDICINE

## 2024-12-02 PROCEDURE — 2500000001 HC RX 250 WO HCPCS SELF ADMINISTERED DRUGS (ALT 637 FOR MEDICARE OP)

## 2024-12-02 PROCEDURE — 85027 COMPLETE CBC AUTOMATED: CPT

## 2024-12-02 PROCEDURE — 74177 CT ABD & PELVIS W/CONTRAST: CPT | Mod: FOREIGN READ | Performed by: RADIOLOGY

## 2024-12-02 PROCEDURE — 84443 ASSAY THYROID STIM HORMONE: CPT | Performed by: INTERNAL MEDICINE

## 2024-12-02 PROCEDURE — 99223 1ST HOSP IP/OBS HIGH 75: CPT

## 2024-12-02 PROCEDURE — 85610 PROTHROMBIN TIME: CPT | Performed by: STUDENT IN AN ORGANIZED HEALTH CARE EDUCATION/TRAINING PROGRAM

## 2024-12-02 PROCEDURE — 2500000004 HC RX 250 GENERAL PHARMACY W/ HCPCS (ALT 636 FOR OP/ED): Performed by: STUDENT IN AN ORGANIZED HEALTH CARE EDUCATION/TRAINING PROGRAM

## 2024-12-02 PROCEDURE — 81003 URINALYSIS AUTO W/O SCOPE: CPT | Performed by: STUDENT IN AN ORGANIZED HEALTH CARE EDUCATION/TRAINING PROGRAM

## 2024-12-02 PROCEDURE — 74177 CT ABD & PELVIS W/CONTRAST: CPT

## 2024-12-02 PROCEDURE — 82746 ASSAY OF FOLIC ACID SERUM: CPT | Mod: GEALAB | Performed by: INTERNAL MEDICINE

## 2024-12-02 PROCEDURE — 83540 ASSAY OF IRON: CPT | Performed by: INTERNAL MEDICINE

## 2024-12-02 PROCEDURE — 99285 EMERGENCY DEPT VISIT HI MDM: CPT | Mod: 25 | Performed by: STUDENT IN AN ORGANIZED HEALTH CARE EDUCATION/TRAINING PROGRAM

## 2024-12-02 PROCEDURE — 99222 1ST HOSP IP/OBS MODERATE 55: CPT

## 2024-12-02 PROCEDURE — 2500000002 HC RX 250 W HCPCS SELF ADMINISTERED DRUGS (ALT 637 FOR MEDICARE OP, ALT 636 FOR OP/ED)

## 2024-12-02 RX ORDER — ALBUTEROL SULFATE 90 UG/1
1-2 INHALANT RESPIRATORY (INHALATION) EVERY 6 HOURS PRN
Status: DISCONTINUED | OUTPATIENT
Start: 2024-12-02 | End: 2024-12-05 | Stop reason: HOSPADM

## 2024-12-02 RX ORDER — ACETAMINOPHEN 325 MG/1
4 TABLET ORAL DAILY PRN
COMMUNITY

## 2024-12-02 RX ORDER — ACETAMINOPHEN 650 MG/1
650 SUPPOSITORY RECTAL EVERY 4 HOURS PRN
Status: DISCONTINUED | OUTPATIENT
Start: 2024-12-02 | End: 2024-12-05 | Stop reason: HOSPADM

## 2024-12-02 RX ORDER — ESOMEPRAZOLE MAGNESIUM 40 MG/1
40 GRANULE, DELAYED RELEASE ORAL
Status: DISCONTINUED | OUTPATIENT
Start: 2024-12-02 | End: 2024-12-05

## 2024-12-02 RX ORDER — AMOXICILLIN 250 MG
2 CAPSULE ORAL NIGHTLY PRN
Status: DISCONTINUED | OUTPATIENT
Start: 2024-12-02 | End: 2024-12-05 | Stop reason: HOSPADM

## 2024-12-02 RX ORDER — VENLAFAXINE 75 MG/1
37.5 TABLET ORAL DAILY
Status: DISCONTINUED | OUTPATIENT
Start: 2024-12-03 | End: 2024-12-05 | Stop reason: HOSPADM

## 2024-12-02 RX ORDER — DEXTROAMPHETAMINE SACCHARATE, AMPHETAMINE ASPARTATE, DEXTROAMPHETAMINE SULFATE AND AMPHETAMINE SULFATE 2.5; 2.5; 2.5; 2.5 MG/1; MG/1; MG/1; MG/1
30 TABLET ORAL 2 TIMES DAILY
Status: DISCONTINUED | OUTPATIENT
Start: 2024-12-02 | End: 2024-12-02

## 2024-12-02 RX ORDER — PROCHLORPERAZINE EDISYLATE 5 MG/ML
10 INJECTION INTRAMUSCULAR; INTRAVENOUS ONCE
Status: COMPLETED | OUTPATIENT
Start: 2024-12-02 | End: 2024-12-02

## 2024-12-02 RX ORDER — PANTOPRAZOLE SODIUM 40 MG/10ML
40 INJECTION, POWDER, LYOPHILIZED, FOR SOLUTION INTRAVENOUS
Status: DISCONTINUED | OUTPATIENT
Start: 2024-12-02 | End: 2024-12-05

## 2024-12-02 RX ORDER — ZOLPIDEM TARTRATE 5 MG/1
10 TABLET ORAL NIGHTLY
Status: DISCONTINUED | OUTPATIENT
Start: 2024-12-02 | End: 2024-12-05 | Stop reason: HOSPADM

## 2024-12-02 RX ORDER — PANTOPRAZOLE SODIUM 40 MG/1
40 TABLET, DELAYED RELEASE ORAL
Status: DISCONTINUED | OUTPATIENT
Start: 2024-12-02 | End: 2024-12-05 | Stop reason: HOSPADM

## 2024-12-02 RX ORDER — ACETAMINOPHEN 325 MG/1
650 TABLET ORAL EVERY 4 HOURS PRN
Status: DISCONTINUED | OUTPATIENT
Start: 2024-12-02 | End: 2024-12-05 | Stop reason: HOSPADM

## 2024-12-02 RX ORDER — VENLAFAXINE 75 MG/1
37.5 TABLET ORAL DAILY
Status: DISCONTINUED | OUTPATIENT
Start: 2024-12-02 | End: 2024-12-02

## 2024-12-02 RX ORDER — ESCITALOPRAM OXALATE 20 MG/1
20 TABLET ORAL DAILY
Status: DISCONTINUED | OUTPATIENT
Start: 2024-12-03 | End: 2024-12-05 | Stop reason: HOSPADM

## 2024-12-02 RX ORDER — DIPHENHYDRAMINE HYDROCHLORIDE 50 MG/ML
25 INJECTION INTRAMUSCULAR; INTRAVENOUS ONCE
Status: COMPLETED | OUTPATIENT
Start: 2024-12-02 | End: 2024-12-02

## 2024-12-02 RX ORDER — ACETAMINOPHEN 160 MG/5ML
650 SOLUTION ORAL EVERY 4 HOURS PRN
Status: DISCONTINUED | OUTPATIENT
Start: 2024-12-02 | End: 2024-12-05 | Stop reason: HOSPADM

## 2024-12-02 RX ORDER — LEVOTHYROXINE SODIUM 75 UG/1
75 TABLET ORAL DAILY
Status: DISCONTINUED | OUTPATIENT
Start: 2024-12-03 | End: 2024-12-05 | Stop reason: HOSPADM

## 2024-12-02 RX ORDER — ATENOLOL 50 MG/1
50 TABLET ORAL 2 TIMES DAILY
Status: DISCONTINUED | OUTPATIENT
Start: 2024-12-02 | End: 2024-12-05 | Stop reason: HOSPADM

## 2024-12-02 RX ORDER — AMOXICILLIN 250 MG
2 CAPSULE ORAL 2 TIMES DAILY
Status: DISCONTINUED | OUTPATIENT
Start: 2024-12-02 | End: 2024-12-02

## 2024-12-02 RX ORDER — ESCITALOPRAM OXALATE 20 MG/1
20 TABLET ORAL DAILY
Status: DISCONTINUED | OUTPATIENT
Start: 2024-12-02 | End: 2024-12-02

## 2024-12-02 RX ORDER — DEXTROAMPHETAMINE SACCHARATE, AMPHETAMINE ASPARTATE, DEXTROAMPHETAMINE SULFATE AND AMPHETAMINE SULFATE 2.5; 2.5; 2.5; 2.5 MG/1; MG/1; MG/1; MG/1
30 TABLET ORAL 2 TIMES DAILY
Status: DISCONTINUED | OUTPATIENT
Start: 2024-12-03 | End: 2024-12-02

## 2024-12-02 SDOH — SOCIAL STABILITY: SOCIAL INSECURITY
WITHIN THE LAST YEAR, HAVE YOU BEEN RAPED OR FORCED TO HAVE ANY KIND OF SEXUAL ACTIVITY BY YOUR PARTNER OR EX-PARTNER?: NO

## 2024-12-02 SDOH — SOCIAL STABILITY: SOCIAL INSECURITY: WITHIN THE LAST YEAR, HAVE YOU BEEN HUMILIATED OR EMOTIONALLY ABUSED IN OTHER WAYS BY YOUR PARTNER OR EX-PARTNER?: NO

## 2024-12-02 SDOH — ECONOMIC STABILITY: INCOME INSECURITY: IN THE PAST 12 MONTHS HAS THE ELECTRIC, GAS, OIL, OR WATER COMPANY THREATENED TO SHUT OFF SERVICES IN YOUR HOME?: NO

## 2024-12-02 SDOH — ECONOMIC STABILITY: TRANSPORTATION INSECURITY: IN THE PAST 12 MONTHS, HAS LACK OF TRANSPORTATION KEPT YOU FROM MEDICAL APPOINTMENTS OR FROM GETTING MEDICATIONS?: NO

## 2024-12-02 SDOH — ECONOMIC STABILITY: HOUSING INSECURITY: IN THE LAST 12 MONTHS, WAS THERE A TIME WHEN YOU WERE NOT ABLE TO PAY THE MORTGAGE OR RENT ON TIME?: NO

## 2024-12-02 SDOH — SOCIAL STABILITY: SOCIAL INSECURITY: ABUSE: ADULT

## 2024-12-02 SDOH — ECONOMIC STABILITY: FOOD INSECURITY: WITHIN THE PAST 12 MONTHS, YOU WORRIED THAT YOUR FOOD WOULD RUN OUT BEFORE YOU GOT THE MONEY TO BUY MORE.: NEVER TRUE

## 2024-12-02 SDOH — ECONOMIC STABILITY: HOUSING INSECURITY: AT ANY TIME IN THE PAST 12 MONTHS, WERE YOU HOMELESS OR LIVING IN A SHELTER (INCLUDING NOW)?: NO

## 2024-12-02 SDOH — SOCIAL STABILITY: SOCIAL INSECURITY: WITHIN THE LAST YEAR, HAVE YOU BEEN AFRAID OF YOUR PARTNER OR EX-PARTNER?: NO

## 2024-12-02 SDOH — ECONOMIC STABILITY: FOOD INSECURITY: WITHIN THE PAST 12 MONTHS, THE FOOD YOU BOUGHT JUST DIDN'T LAST AND YOU DIDN'T HAVE MONEY TO GET MORE.: NEVER TRUE

## 2024-12-02 SDOH — SOCIAL STABILITY: SOCIAL INSECURITY: WERE YOU ABLE TO COMPLETE ALL THE BEHAVIORAL HEALTH SCREENINGS?: YES

## 2024-12-02 SDOH — SOCIAL STABILITY: SOCIAL INSECURITY: HAS ANYONE EVER THREATENED TO HURT YOUR FAMILY OR YOUR PETS?: NO

## 2024-12-02 SDOH — ECONOMIC STABILITY: HOUSING INSECURITY: IN THE PAST 12 MONTHS, HOW MANY TIMES HAVE YOU MOVED WHERE YOU WERE LIVING?: 0

## 2024-12-02 SDOH — SOCIAL STABILITY: SOCIAL INSECURITY
WITHIN THE LAST YEAR, HAVE YOU BEEN KICKED, HIT, SLAPPED, OR OTHERWISE PHYSICALLY HURT BY YOUR PARTNER OR EX-PARTNER?: NO

## 2024-12-02 SDOH — SOCIAL STABILITY: SOCIAL INSECURITY: HAVE YOU HAD ANY THOUGHTS OF HARMING ANYONE ELSE?: NO

## 2024-12-02 SDOH — ECONOMIC STABILITY: FOOD INSECURITY: HOW HARD IS IT FOR YOU TO PAY FOR THE VERY BASICS LIKE FOOD, HOUSING, MEDICAL CARE, AND HEATING?: NOT HARD AT ALL

## 2024-12-02 SDOH — SOCIAL STABILITY: SOCIAL INSECURITY: ARE THERE ANY APPARENT SIGNS OF INJURIES/BEHAVIORS THAT COULD BE RELATED TO ABUSE/NEGLECT?: NO

## 2024-12-02 SDOH — SOCIAL STABILITY: SOCIAL INSECURITY: HAVE YOU HAD THOUGHTS OF HARMING ANYONE ELSE?: NO

## 2024-12-02 SDOH — SOCIAL STABILITY: SOCIAL INSECURITY: DO YOU FEEL UNSAFE GOING BACK TO THE PLACE WHERE YOU ARE LIVING?: NO

## 2024-12-02 SDOH — SOCIAL STABILITY: SOCIAL INSECURITY: ARE YOU OR HAVE YOU BEEN THREATENED OR ABUSED PHYSICALLY, EMOTIONALLY, OR SEXUALLY BY ANYONE?: NO

## 2024-12-02 SDOH — SOCIAL STABILITY: SOCIAL INSECURITY: DO YOU FEEL ANYONE HAS EXPLOITED OR TAKEN ADVANTAGE OF YOU FINANCIALLY OR OF YOUR PERSONAL PROPERTY?: NO

## 2024-12-02 SDOH — SOCIAL STABILITY: SOCIAL INSECURITY: DOES ANYONE TRY TO KEEP YOU FROM HAVING/CONTACTING OTHER FRIENDS OR DOING THINGS OUTSIDE YOUR HOME?: NO

## 2024-12-02 ASSESSMENT — PATIENT HEALTH QUESTIONNAIRE - PHQ9
2. FEELING DOWN, DEPRESSED OR HOPELESS: NOT AT ALL
1. LITTLE INTEREST OR PLEASURE IN DOING THINGS: NOT AT ALL
SUM OF ALL RESPONSES TO PHQ9 QUESTIONS 1 & 2: 0

## 2024-12-02 ASSESSMENT — COGNITIVE AND FUNCTIONAL STATUS - GENERAL
PATIENT BASELINE BEDBOUND: NO
MOBILITY SCORE: 24
DAILY ACTIVITIY SCORE: 24
MOBILITY SCORE: 24
DAILY ACTIVITIY SCORE: 24
MOBILITY SCORE: 24
DAILY ACTIVITIY SCORE: 24

## 2024-12-02 ASSESSMENT — LIFESTYLE VARIABLES
HAVE YOU EVER FELT YOU SHOULD CUT DOWN ON YOUR DRINKING: NO
HOW OFTEN DO YOU HAVE A DRINK CONTAINING ALCOHOL: NEVER
AUDIT-C TOTAL SCORE: 0
HOW MANY STANDARD DRINKS CONTAINING ALCOHOL DO YOU HAVE ON A TYPICAL DAY: PATIENT DOES NOT DRINK
HAVE PEOPLE ANNOYED YOU BY CRITICIZING YOUR DRINKING: NO
EVER HAD A DRINK FIRST THING IN THE MORNING TO STEADY YOUR NERVES TO GET RID OF A HANGOVER: NO
HOW OFTEN DO YOU HAVE 6 OR MORE DRINKS ON ONE OCCASION: NEVER
TOTAL SCORE: 0
AUDIT-C TOTAL SCORE: 0
SKIP TO QUESTIONS 9-10: 1
EVER FELT BAD OR GUILTY ABOUT YOUR DRINKING: NO

## 2024-12-02 ASSESSMENT — PAIN DESCRIPTION - LOCATION
LOCATION: HEAD
LOCATION: ABDOMEN

## 2024-12-02 ASSESSMENT — PAIN - FUNCTIONAL ASSESSMENT
PAIN_FUNCTIONAL_ASSESSMENT: 0-10

## 2024-12-02 ASSESSMENT — PAIN DESCRIPTION - DESCRIPTORS
DESCRIPTORS: CRAMPING
DESCRIPTORS: ACHING
DESCRIPTORS: ACHING

## 2024-12-02 ASSESSMENT — ACTIVITIES OF DAILY LIVING (ADL)
LACK_OF_TRANSPORTATION: NO
LACK_OF_TRANSPORTATION: NO
ADEQUATE_TO_COMPLETE_ADL: YES
LACK_OF_TRANSPORTATION: NO
WALKS IN HOME: INDEPENDENT
FEEDING YOURSELF: INDEPENDENT
HEARING - LEFT EAR: DIFFICULTY WITH NOISE
GROOMING: INDEPENDENT
HEARING - RIGHT EAR: DIFFICULTY WITH NOISE
JUDGMENT_ADEQUATE_SAFELY_COMPLETE_DAILY_ACTIVITIES: YES
TOILETING: INDEPENDENT
DRESSING YOURSELF: INDEPENDENT
PATIENT'S MEMORY ADEQUATE TO SAFELY COMPLETE DAILY ACTIVITIES?: YES
BATHING: INDEPENDENT

## 2024-12-02 ASSESSMENT — PAIN SCALES - GENERAL
PAINLEVEL_OUTOF10: 3
PAINLEVEL_OUTOF10: 9
PAINLEVEL_OUTOF10: 9
PAINLEVEL_OUTOF10: 2
PAINLEVEL_OUTOF10: 0 - NO PAIN

## 2024-12-02 ASSESSMENT — ENCOUNTER SYMPTOMS
ABDOMINAL DISTENTION: 1
FATIGUE: 1
BLOOD IN STOOL: 1
ABDOMINAL PAIN: 1
DIAPHORESIS: 1
ACTIVITY CHANGE: 1

## 2024-12-02 ASSESSMENT — PAIN DESCRIPTION - PAIN TYPE: TYPE: CHRONIC PAIN

## 2024-12-02 ASSESSMENT — PAIN DESCRIPTION - ORIENTATION: ORIENTATION: MID

## 2024-12-02 ASSESSMENT — PAIN DESCRIPTION - PROGRESSION: CLINICAL_PROGRESSION: NOT CHANGED

## 2024-12-02 NOTE — ED TRIAGE NOTES
Pt here for rectal bleeding described as the entire toilet appeared to be bright red with loose stools but unable to determine if stool itself was discolored. No thinners. Bleeding started LN. Abd pain chronic described as gen middle. Also nonprod cough that started today with N. Denies urinary changes.

## 2024-12-02 NOTE — H&P
History Of Present Illness  Kimberly Trent is a 69 y.o. female with PMH of HTN, ADD, depression/anxiety, insomnia, hypothyroidism, postmenopausal hypogonadism , presenting with weakness abdominal pain and rectal bleeding.    Patient presents complaining of severe abdominal pain which worsened last night (12/1/2024).  She endorses a 3-month history of abdominal pain which she attributes to her GERD.  She explains that her pain is usually relieved with lying on her left side, and is generally responsive to omeprazole. She states the pain has gotten so severe at times that it has brought her to tears.   Last night she reports her pain had significantly worsened to that point-she describes the character of the pain as generalized all over her belly.  At 8:00 PM 12/2 she reports using the bathroom to try and relieve her pain-she noted bright red blood per rectum that filled the toilet.  She also reports that she had been feeling progressively fatigued during the last 3 months as well yet this was the first bowel movement that she noted with bright red blood. She states she experienced some relief after her bloody bowel movement.    Patient also endorses a history of palpitations, and states she was started on atenolol many years ago by her cardiologist that she had stopped seeing 6 years ago.  She reports never following up since.    Patient currently also endorses a headache,-she reports a history of chronic headaches and states that she takes Excedrin almost daily to treat them.    She reports hypothyroidism following a thyroidectomy 10+ years ago for a malignancy.  Currently endorses a tender spot/nodule on her anterior left neck.    Admits cold intolerance, headache, abdominal pain, weakness, malaise, palpitations, paresthesias (bilateral hands-attributes to Raynaud's)  Denies constipation, diarrhea, chest pain, shortness of breath, visual changes      ED course:  Lab work obtained, notable for 8.1 Hgb (down from  11  4 months ago).  CTAP was obtained largely unremarkable aside from some diverticuli, negative for diverticulitis.  No signs of active bleed.  Rectal exam in ED normal, no external hemorrhoids, no palpable internal hemorrhoids, no frankly bloody stool.      Past Medical History  She has a past medical history of Abnormal findings on diagnostic imaging of heart and coronary circulation (02/24/2014), Breast cancer (Multi), Effusion, right knee (10/29/2019), Fracture of coccyx, initial encounter for closed fracture (Multi) (01/19/2020), Other hypertrophic disorders of the skin (07/16/2020), Personal history of irradiation, Personal history of other diseases of the circulatory system (03/29/2020), and Personal history of other diseases of the circulatory system (01/12/2015).    Surgical History  She has a past surgical history that includes Thyroid surgery (01/20/2014); Other surgical history (08/27/2020); Other surgical history (08/27/2020); Other surgical history (08/27/2020); Other surgical history (08/27/2020); Other surgical history (08/27/2020); Other surgical history (08/27/2020); Other surgical history (08/27/2020); Other surgical history (08/27/2020); and Breast lumpectomy.     Social History  She reports that she has never smoked. She has never used smokeless tobacco. She reports current alcohol use. No history on file for drug use.    Family History  Family History   Problem Relation Name Age of Onset    Ovarian cancer Mother      Ovarian cancer Sister          Allergies  Patient has no known allergies.     Physical Exam  Constitutional:       General: She is not in acute distress.  Neck:      Comments: Small mass felt at left anterior neck  Tender to palpation  Cardiovascular:      Rate and Rhythm: Normal rate.      Heart sounds:      No friction rub. No gallop.   Pulmonary:      Effort: No respiratory distress.      Breath sounds: No wheezing.   Abdominal:      General: There is distension.       Tenderness: There is abdominal tenderness.      Comments: Abdomen appears somewhat distended, overall diffusely tender to palpation.  Increased tenderness to palpation in bilateral lower quadrants.   Musculoskeletal:      Cervical back: Tenderness present.   Neurological:      Mental Status: She is alert.          Last Recorded Vitals  /58 (BP Location: Left arm, Patient Position: Sitting)   Pulse 74   Temp 36.7 °C (98.1 °F) (Temporal)   Resp 17   Wt 64.4 kg (142 lb)   SpO2 100%     Relevant Results  CT abdomen pelvis w IV contrast    Result Date: 12/2/2024  STUDY: CT Abdomen and Pelvis with IV Contrast; 12/02/2024, 1:06 PM. INDICATION: Generalized abdominal pain, rectal bleeding. COMPARISON: Not available. ACCESSION NUMBER(S): [id number] ORDERING CLINICIAN: GIBRAN LANTIGUA TECHNIQUE: CT of the abdomen and pelvis was performed.  Contiguous axial images were obtained at 3 mm slice thickness through the abdomen and pelvis. Coronal and sagittal reconstructions at 3 mm slice thickness were performed.  Omnipaque 350 75 mL was administered intravenously. FINDINGS: Partially visualized chest showing the heart to be of normal size. No pericardial effusion.  Lung bases are clear. Abdomen: No acute abnormality of the stomach is identified. The liver of normal size and contour. No intrahepatic ductal dilatation is identified.  Gallbladder collapsed and otherwise normal.  No acute abnormality of the pancreas.  Spleen of normal appearance.  Adrenal glands of normal appearance. No acute renal process. No retroperitoneal adenopathy. No findings of abdominal aortic aneurysm. No bowel obstruction.  No free air.  No free fluid.  There are a few scattered colonic diverticulum.  No findings of diverticulitis. The appendix is not identified.  There is no acute abnormality within its expected location. Pelvis: No pelvic free fluid.  No inflammatory change or findings of free air. No findings of pelvic adenopathy.  Status  post hysterectomy. Skeleton: Lumbar dextroscoliotic curvature, spondylotic changes and facet arthrosis.  No acute bony process is identified.    No findings of an acute abdominal or pelvic process. There are a few scattered colonic diverticulum. No findings of diverticulitis.  No discrete abnormality of the rectosigmoid colon identified.  Consider direct visualization if there is continued clinical concern. Status post hysterectomy. Lumbar dextroscoliotic curvature, spondylotic changes and facet arthrosis. No acute bony process is identified. Signed by Landen Oakes MD      Assessment/Plan   Assessment & Plan      Kimberly Trent is a 69 y.o. female with PMH of HTN, ADD, depression/anxiety, insomnia, hypothyroidism, postmenopausal hypogonadism , presenting with weakness abdominal pain and rectal bleeding.    ACUTE ISSUES:  # Abdominal pain  # Bloody bowel movement  # Anemia; Weakness  # GERD  - Etiology of weakness is likely from anemia. Suspecting GI losses in light of abdominal pain, heartburn, and bloody bowel movement (1x).   PLAN:  -GI consulted-appreciate recs  -Monitor hemoglobin  -Consented for blood transfusion; type and screen ordered;   -Transfuse <7  -Start PPI BID    #Headache:  - Patient states she has long history of chronic headaches. States she takes OTC Excedrin daily at home.  PLAN:  - Diphenhydramine, prochlorperazine, and Tylenol     #? Hx of Afib  #? Hx of syncope  # HTN  - Patient reports history of Afib and syncope. States that she had seen a cardiologist 6+ years ago for this issue, and eventually stopped following up.  -Reports frequent falls in the past.  PLAN:  - Start Tele, monitor for arrhythmia  - Continue home atenolol  - Cardiology referral on discharge    CHRONIC ISSUES:  # Hypothyroidism  - s/p thyroidectomy for malignancy (10+ years ago)  - continue home levothyroxine  - Small tender nodule felt on physical exam. Recommend outpatient ultrasound.  - Order TSH    # ADD- continue  home Adderall    # Depression/anxiety- continue home Lexapro and Effexor    # Insomnia- continue home Ambien   -No past sleep study. Chronic Ambien use for insomnia.   -Reports poor sleep quality and fatigue.   -Etiology: Polypharmacy (adderall 30mg BID) vs MAYA   -Sleep Medicine referral on discharge      Fluid: Replete PRN  Electrolytes: Replete PRN  Nutrition: N.p.o. midnight  GI PPx: PPI twice daily  DVT/PE PPx: -  Abx: -  IV Lines: PIV  O2: RA    Disposition: Admit to observation for significant abdominal pain, weakness and bloody bowel movement-monitoring hemoglobin & consulted GI, eLOS <48h  Code Status: Full    Josephine Lopez DO, PGY-1  Internal Medicine   12/2/24 at 3:05 PM.

## 2024-12-02 NOTE — ED PROVIDER NOTES
Chief Complaint: Bright red blood per rectum  HPI: This is a 69-year-old female, presenting to the emergency department for evaluation after a bloody bowel movement which occurred last night.  Patient states that she was having severe abdominal cramping, and then had a grossly bloody bowel movement last night.  She states that since then she has been having more intermittent abdominal cramping.  She denies any chest pain, fevers, chills, cough, congestion.  She denies any dysuria or hematuria.    Past Medical History:   Diagnosis Date    Abnormal findings on diagnostic imaging of heart and coronary circulation 2014    Abnormal echocardiogram    Breast cancer (Multi)     Effusion, right knee 10/29/2019    Effusion of right knee    Fracture of coccyx, initial encounter for closed fracture (Multi) 2020    Closed fracture of coccyx, initial encounter    Other hypertrophic disorders of the skin 2020    Skin tag    Personal history of irradiation     Personal history of other diseases of the circulatory system 2020    History of hypertension    Personal history of other diseases of the circulatory system 2015    History of coronary artery disease      Past Surgical History:   Procedure Laterality Date    BREAST LUMPECTOMY      OTHER SURGICAL HISTORY  2020    Knee arthroscopy    OTHER SURGICAL HISTORY  2020     section    OTHER SURGICAL HISTORY  2020    Abdominal surgery    OTHER SURGICAL HISTORY  2020    Facial surgery    OTHER SURGICAL HISTORY  2020    Lumpectomy    OTHER SURGICAL HISTORY  2020    Breast augmentation    OTHER SURGICAL HISTORY  2020    Mass excision    OTHER SURGICAL HISTORY  2020    Hysterectomy vaginal    THYROID SURGERY  2014    Thyroid Surgery       Physical Exam  Vitals and nursing note reviewed.   Constitutional:       Appearance: Normal appearance.   HENT:      Head: Normocephalic and atraumatic.       Mouth/Throat:      Mouth: Mucous membranes are moist.   Eyes:      Conjunctiva/sclera: Conjunctivae normal.   Cardiovascular:      Rate and Rhythm: Normal rate.   Pulmonary:      Effort: Pulmonary effort is normal.   Abdominal:      General: Abdomen is flat.      Palpations: Abdomen is soft.      Tenderness: There is generalized abdominal tenderness.   Genitourinary:     Comments: Normal rectum, no bleeding.  No internal hemorrhoids.    Musculoskeletal:         General: Normal range of motion.      Cervical back: Normal range of motion.   Skin:     General: Skin is warm and dry.   Neurological:      General: No focal deficit present.      Mental Status: She is alert.   Psychiatric:         Mood and Affect: Mood normal.            ED Course/[facility]  Diagnoses as of 12/02/24 1517   Gastrointestinal hemorrhage, unspecified gastrointestinal hemorrhage type   Anemia, unspecified type     This is a 69 y.o. female presenting to the ED for evaluation of bloody bowel movement which occurred last night.  On physical exam, the patient is resting comfortably in the bed, no acute distress.  Abdomen is soft with some diffuse tenderness.  Rectal exam is normal, no external hemorrhoids.  No palpable internal hemorrhoids, and no frankly bloody stool on my rectal exam.  Lab work was obtained, and is notable for a significantly lower hemoglobin down from 11 4 months ago to 8.1 today.  CT abdomen pelvis was otherwise grossly unremarkable apart from some diverticuli though no sign diverticulitis.  No active bleeding.  Given the significant decrease in her hemoglobin with reported bloody bowel movement yesterday, I do feel that she requires admission for serial hemoglobins as well as GI consult.  Spoke with GI who will see the patient on the floor.  She was admitted in stable condition.    Final Impression  1.  GI bleeding  2.  Anemia  Disposition/Plan: Admit to medicine  Condition at disposition: Stable.     Franny Dumas,   Emergency  Medicine Physician     Franny Dumas, DO  12/02/24 1522

## 2024-12-02 NOTE — PROGRESS NOTES
12/02/24 1509   Discharge Planning   Living Arrangements Alone  (Kids live close)   Support Systems Children   Assistance Needed A&OX4; independent with ADLs with no DME; drives; room air baseline and currently room air; PCP is Dr larry martino   Type of Residence Private residence   Number of Stairs to Enter Residence 0   Number of Stairs Within Residence 28  (3 story house)   Do you have animals or pets at home? Yes   Type of Animals or Pets 2 dogs (children are watching)   Who is requesting discharge planning? Provider   Expected Discharge Disposition Home  (DC dispo is pending GI/Surgery workup)   Does the patient need discharge transport arranged? No   Financial Resource Strain   How hard is it for you to pay for the very basics like food, housing, medical care, and heating? Not hard   Housing Stability   In the last 12 months, was there a time when you were not able to pay the mortgage or rent on time? N   In the past 12 months, how many times have you moved where you were living? 0   At any time in the past 12 months, were you homeless or living in a shelter (including now)? N   Transportation Needs   In the past 12 months, has lack of transportation kept you from medical appointments or from getting medications? no   In the past 12 months, has lack of transportation kept you from meetings, work, or from getting things needed for daily living? No     12/02/2024 1512pm  Spoke with patient and patient's son bedside in ED

## 2024-12-02 NOTE — PROGRESS NOTES
"Pharmacy Medication History Review    Katherine Trent \"Kimberly\" is a 69 y.o. female admitted for No Principal Problem: There is no principal problem currently on the Problem List. Please update the Problem List and refresh.. Pharmacy reviewed the patient's [id number] medications and allergies for accuracy.    The list below reflectives the updated PTA list. Please review each medication in order reconciliation for additional clarification and justification.  Prior to Admission Medications   Prescriptions Last Dose Informant Patient Reported? Taking?   acetaminophen (Tylenol) 325 mg tablet 12/1/2024 Self Yes Yes   Sig: Take 4 tablets (1,300 mg) by mouth once daily as needed for mild pain (1 - 3).   albuterol 90 mcg/actuation inhaler 11/29/2024  Yes Yes   Sig: Inhale 1-2 puffs every 6 hours if needed for wheezing.   amphetamine-dextroamphetamine (Adderall) 30 mg tablet 12/1/2024 Evening Self Yes Yes   Sig: Take 1 tablet (30 mg) by mouth 2 times a day.   atenolol (Tenormin) 50 mg tablet 12/1/2024 Evening Self Yes Yes   Sig: Take 1 tablet (50 mg) by mouth 2 times a day.   azithromycin (Zithromax) 250 mg tablet Not Taking Self No No   Sig: Take 1 tablet (250 mg) by mouth once daily. Take as directed. Take 2 tablets by mouth the first day and then once daily until complete .   Patient not taking: Reported on 12/2/2024   escitalopram (Lexapro) 20 mg tablet 12/1/2024 Evening Self Yes Yes   Sig: Take 1 tablet (20 mg) by mouth once daily.   estrogens-methyltestosterone (Estratest HS) 0.625-1.25 mg tablet 12/1/2024 Evening Self Yes Yes   Sig: Take 1 tablet by mouth once daily.   levothyroxine (Synthroid, Levoxyl) 75 mcg tablet 12/2/2024 Morning Self Yes Yes   Sig: Take 1 tablet (75 mcg) by mouth once daily.   multivitamin (Daily Multi-Vitamin) tablet 12/1/2024 Morning Self Yes Yes   Sig: Take by mouth.   sulfamethoxazole-trimethoprim (Bactrim DS) 800-160 mg tablet Not Taking  No No   Sig: Take 1 tablet by mouth 2 " times a day for 7 days.   Patient not taking: Reported on 12/2/2024   venlafaxine (Effexor) 37.5 mg tablet 12/1/2024 Evening Self Yes Yes   Sig: Take 1 tablet (37.5 mg) by mouth once daily.   zolpidem CR (Ambien CR) 12.5 mg ER tablet 12/1/2024 Evening Self Yes Yes   Sig: Take 1 tablet (12.5 mg) by mouth once daily at bedtime.      Facility-Administered Medications: None           The list below reflectives the updated allergy list. Please review each documented allergy for additional clarification and justification.  Allergies  Reviewed by Kim Coppola RN on 12/2/2024   No Known Allergies         Below are additional concerns with the patient's PTA list.      Dania Lopez

## 2024-12-02 NOTE — CONSULTS
Reason For Consult  Worsening anemia in the setting of abdominal pain and bloody stool and GERD    History Of Present Illness  Kimberly Trent is a 69 y.o. female with PMH of HTN, ADD, depression/anxiety, insomnia, hypothyroidism, postmenopausal hypogonadism, GERD, presenting with abdominal pain, bloody stool and lethargy.     Patient endorses she has had ongoing abdominal discomfort and distention, excess gas along with lethargy and weakness for 3 months. She states last night she had increased lower mid abdominal pain and noted blood in the toilet along with formed stool. She states the pain is relieved by lying on her left side. She also endorses significant GERD which she takes omeprazole daily. Denies nausea, emesis, epigastric pain, diarrhea, melena. Patient states the blood was one episode at 8pm last night. Per ED MD, rectal exam noted absence of blood or tarry stool.    Patient also endorses nodule on her anterior left neck, denies pain. Nodule mobile on palpation.   Patient also states she went through menopause in her 40's but has been having increased hot flashes through the day.   Significant labs include hgb 8.1, down from 11.2 in July. UA negative.   CT A/P with contrast no significant findings  Patient denies ever having colonoscopy or EGD   Past Medical History  She has a past medical history of Abnormal findings on diagnostic imaging of heart and coronary circulation (02/24/2014), Breast cancer (Multi), Effusion, right knee (10/29/2019), Fracture of coccyx, initial encounter for closed fracture (Multi) (01/19/2020), Other hypertrophic disorders of the skin (07/16/2020), Personal history of irradiation, Personal history of other diseases of the circulatory system (03/29/2020), and Personal history of other diseases of the circulatory system (01/12/2015).    Surgical History  She has a past surgical history that includes Thyroid surgery (01/20/2014); Other surgical history (08/27/2020); Other  "surgical history (08/27/2020); Other surgical history (08/27/2020); Other surgical history (08/27/2020); Other surgical history (08/27/2020); Other surgical history (08/27/2020); Other surgical history (08/27/2020); Other surgical history (08/27/2020); and Breast lumpectomy.     Social History  She reports that she has never smoked. She has never used smokeless tobacco. She reports current alcohol use. No history on file for drug use.    Family History  Family History   Problem Relation Name Age of Onset    Ovarian cancer Mother      Ovarian cancer Sister          Allergies  Patient has no known allergies.    Review of Systems  Review of Systems   Constitutional:  Positive for activity change, diaphoresis and fatigue.   Gastrointestinal:  Positive for abdominal distention, abdominal pain and blood in stool.   All other systems reviewed and are negative.        Physical Exam  Physical Exam  Vitals reviewed.   Constitutional:       Appearance: Normal appearance.   Eyes:      Extraocular Movements: Extraocular movements intact.   Cardiovascular:      Rate and Rhythm: Regular rhythm.      Heart sounds: Normal heart sounds.   Pulmonary:      Effort: Pulmonary effort is normal.      Breath sounds: Normal breath sounds.   Abdominal:      General: Bowel sounds are normal. There is distension.      Palpations: Abdomen is soft.      Tenderness: There is abdominal tenderness.   Genitourinary:     Rectum: Normal.   Skin:     General: Skin is warm.   Neurological:      Mental Status: She is alert and oriented to person, place, and time.   Psychiatric:         Behavior: Behavior normal.           Last Recorded Vitals  Blood pressure 150/82, pulse 71, temperature 36.6 °C (97.9 °F), temperature source Temporal, resp. rate 18, height 1.676 m (5' 6\"), weight 66.1 kg (145 lb 12.8 oz), SpO2 98%.    Relevant Results    Results for orders placed or performed during the hospital encounter of 12/02/24 (from the past 24 hours)   CBC and " Auto Differential   Result Value Ref Range    WBC 9.6 4.4 - 11.3 x10*3/uL    nRBC 0.0 0.0 - 0.0 /100 WBCs    RBC 3.09 (L) 4.00 - 5.20 x10*6/uL    Hemoglobin 8.1 (L) 12.0 - 16.0 g/dL    Hematocrit 26.5 (L) 36.0 - 46.0 %    MCV 86 80 - 100 fL    MCH 26.2 26.0 - 34.0 pg    MCHC 30.6 (L) 32.0 - 36.0 g/dL    RDW 15.0 (H) 11.5 - 14.5 %    Platelets 306 150 - 450 x10*3/uL    Neutrophils % 59.5 40.0 - 80.0 %    Immature Granulocytes %, Automated 0.4 0.0 - 0.9 %    Lymphocytes % 28.5 13.0 - 44.0 %    Monocytes % 8.7 2.0 - 10.0 %    Eosinophils % 2.4 0.0 - 6.0 %    Basophils % 0.5 0.0 - 2.0 %    Neutrophils Absolute 5.73 1.20 - 7.70 x10*3/uL    Immature Granulocytes Absolute, Automated 0.04 0.00 - 0.70 x10*3/uL    Lymphocytes Absolute 2.75 1.20 - 4.80 x10*3/uL    Monocytes Absolute 0.84 0.10 - 1.00 x10*3/uL    Eosinophils Absolute 0.23 0.00 - 0.70 x10*3/uL    Basophils Absolute 0.05 0.00 - 0.10 x10*3/uL   Comprehensive metabolic panel   Result Value Ref Range    Glucose 94 74 - 99 mg/dL    Sodium 140 136 - 145 mmol/L    Potassium 4.2 3.5 - 5.3 mmol/L    Chloride 101 98 - 107 mmol/L    Bicarbonate 29 21 - 32 mmol/L    Anion Gap 14 10 - 20 mmol/L    Urea Nitrogen 37 (H) 6 - 23 mg/dL    Creatinine 1.00 0.50 - 1.05 mg/dL    eGFR 61 >60 mL/min/1.73m*2    Calcium 8.9 8.6 - 10.3 mg/dL    Albumin 3.9 3.4 - 5.0 g/dL    Alkaline Phosphatase 39 33 - 136 U/L    Total Protein 6.3 (L) 6.4 - 8.2 g/dL    AST 19 9 - 39 U/L    Bilirubin, Total 0.2 0.0 - 1.2 mg/dL    ALT 16 7 - 45 U/L   Magnesium   Result Value Ref Range    Magnesium 1.78 1.60 - 2.40 mg/dL   Protime-INR   Result Value Ref Range    Protime 10.8 9.8 - 12.8 seconds    INR 1.0 0.9 - 1.1   Lipase   Result Value Ref Range    Lipase 49 9 - 82 U/L   TSH   Result Value Ref Range    Thyroid Stimulating Hormone 0.45 0.44 - 3.98 mIU/L   Urinalysis with Reflex Culture and Microscopic   Result Value Ref Range    Color, Urine Light-Yellow Light-Yellow, Yellow, Dark-Yellow    Appearance, Urine  Turbid (N) Clear    Specific Gravity, Urine 1.018 1.005 - 1.035    pH, Urine 7.0 5.0, 5.5, 6.0, 6.5, 7.0, 7.5, 8.0    Protein, Urine NEGATIVE NEGATIVE, 10 (TRACE), 20 (TRACE) mg/dL    Glucose, Urine Normal Normal mg/dL    Blood, Urine NEGATIVE NEGATIVE    Ketones, Urine NEGATIVE NEGATIVE mg/dL    Bilirubin, Urine NEGATIVE NEGATIVE    Urobilinogen, Urine Normal Normal mg/dL    Nitrite, Urine NEGATIVE NEGATIVE    Leukocyte Esterase, Urine NEGATIVE NEGATIVE      CT abdomen pelvis w IV contrast    Result Date: 12/2/2024  STUDY: CT Abdomen and Pelvis with IV Contrast; 12/02/2024, 1:06 PM. INDICATION: Generalized abdominal pain, rectal bleeding. COMPARISON: Not available. ACCESSION NUMBER(S): [id number] ORDERING CLINICIAN: GIBRAN LANTIGUA TECHNIQUE: CT of the abdomen and pelvis was performed.  Contiguous axial images were obtained at 3 mm slice thickness through the abdomen and pelvis. Coronal and sagittal reconstructions at 3 mm slice thickness were performed.  Omnipaque 350 75 mL was administered intravenously. FINDINGS: Partially visualized chest showing the heart to be of normal size. No pericardial effusion.  Lung bases are clear. Abdomen: No acute abnormality of the stomach is identified. The liver of normal size and contour. No intrahepatic ductal dilatation is identified.  Gallbladder collapsed and otherwise normal.  No acute abnormality of the pancreas.  Spleen of normal appearance.  Adrenal glands of normal appearance. No acute renal process. No retroperitoneal adenopathy. No findings of abdominal aortic aneurysm. No bowel obstruction.  No free air.  No free fluid.  There are a few scattered colonic diverticulum.  No findings of diverticulitis. The appendix is not identified.  There is no acute abnormality within its expected location. Pelvis: No pelvic free fluid.  No inflammatory change or findings of free air. No findings of pelvic adenopathy.  Status post hysterectomy. Skeleton: Lumbar dextroscoliotic  curvature, spondylotic changes and facet arthrosis.  No acute bony process is identified.    No findings of an acute abdominal or pelvic process. There are a few scattered colonic diverticulum. No findings of diverticulitis.  No discrete abnormality of the rectosigmoid colon identified.  Consider direct visualization if there is continued clinical concern. Status post hysterectomy. Lumbar dextroscoliotic curvature, spondylotic changes and facet arthrosis. No acute bony process is identified. Signed by Landen Oakes MD      Scheduled medications  atenolol, 50 mg, oral, BID  [START ON 12/3/2024] escitalopram, 20 mg, oral, Daily  pantoprazole, 40 mg, oral, BID AC   Or  esomeprazole, 40 mg, nasoduodenal tube, BID AC   Or  pantoprazole, 40 mg, intravenous, BID AC  [START ON 12/3/2024] levothyroxine, 75 mcg, oral, Daily  [START ON 12/3/2024] venlafaxine, 37.5 mg, oral, Daily  zolpidem, 10 mg, oral, Nightly      Continuous medications     PRN medications  PRN medications: acetaminophen **OR** acetaminophen **OR** acetaminophen, albuterol, sennosides-docusate sodium      Assessment/Plan   Etiology likely diverticular bleed. Differentials include colitis or internal bleeding hemorrhoids. Can not rule out malignancy.   GERD  PLAN  -monitor hgb and hematocrit, transfuse if hgb <7  -continue to trend hgb  -antiemetics  as needed   -PPI  -clear liquid diet, then NPO at midnight   -EGD,Colonoscopy tomorrow     GI will continue to follow     Plan discussed with Dr Fernando Gutierrez, APRN-CNP  I saw and evaluated the patient. I personally obtained the key and critical portions of the history and physical exam or was physically present for key and critical portions performed by the NP. I reviewed the NP's documentation and discussed the patient with the NP. I agree with the NP's medical decision making as documented in the note.

## 2024-12-03 ENCOUNTER — APPOINTMENT (OUTPATIENT)
Dept: CARDIOLOGY | Facility: HOSPITAL | Age: 69
End: 2024-12-03
Payer: MEDICARE

## 2024-12-03 PROBLEM — F32.A ACUTE DEPRESSION: Status: ACTIVE | Noted: 2024-12-03

## 2024-12-03 PROBLEM — M54.16 LUMBAR RADICULOPATHY, CHRONIC: Status: ACTIVE | Noted: 2024-12-03

## 2024-12-03 PROBLEM — E03.9 HYPOTHYROIDISM: Status: ACTIVE | Noted: 2019-05-17

## 2024-12-03 PROBLEM — D22.70 MELANOCYTIC NEVI OF UNSPECIFIED LOWER LIMB, INCLUDING HIP: Status: ACTIVE | Noted: 2020-11-09

## 2024-12-03 PROBLEM — L57.0 ACTINIC KERATOSIS: Status: ACTIVE | Noted: 2020-11-09

## 2024-12-03 PROBLEM — F90.0 ATTENTION DEFICIT HYPERACTIVITY DISORDER (ADHD), PREDOMINANTLY INATTENTIVE TYPE: Status: ACTIVE | Noted: 2019-05-17

## 2024-12-03 PROBLEM — I25.10 ARTERIOSCLEROSIS OF CORONARY ARTERY: Status: ACTIVE | Noted: 2024-12-03

## 2024-12-03 LAB
ABO GROUP (TYPE) IN BLOOD: NORMAL
ALBUMIN SERPL BCP-MCNC: 3.5 G/DL (ref 3.4–5)
ALP SERPL-CCNC: 35 U/L (ref 33–136)
ALT SERPL W P-5'-P-CCNC: 18 U/L (ref 7–45)
ANION GAP SERPL CALC-SCNC: 13 MMOL/L (ref 10–20)
ANTIBODY SCREEN: NORMAL
AST SERPL W P-5'-P-CCNC: 26 U/L (ref 9–39)
ATRIAL RATE: 66 BPM
ATRIAL RATE: 67 BPM
BILIRUB SERPL-MCNC: 0.3 MG/DL (ref 0–1.2)
BUN SERPL-MCNC: 22 MG/DL (ref 6–23)
CALCIUM SERPL-MCNC: 8.7 MG/DL (ref 8.6–10.3)
CHLORIDE SERPL-SCNC: 103 MMOL/L (ref 98–107)
CO2 SERPL-SCNC: 28 MMOL/L (ref 21–32)
CREAT SERPL-MCNC: 1 MG/DL (ref 0.5–1.05)
EGFRCR SERPLBLD CKD-EPI 2021: 61 ML/MIN/1.73M*2
ERYTHROCYTE [DISTWIDTH] IN BLOOD BY AUTOMATED COUNT: 15.6 % (ref 11.5–14.5)
ERYTHROCYTE [DISTWIDTH] IN BLOOD BY AUTOMATED COUNT: 15.7 % (ref 11.5–14.5)
FOLATE SERPL-MCNC: >24 NG/ML
GLUCOSE SERPL-MCNC: 87 MG/DL (ref 74–99)
HCT VFR BLD AUTO: 22.5 % (ref 36–46)
HCT VFR BLD AUTO: 23.8 % (ref 36–46)
HCT VFR BLD AUTO: 24.3 % (ref 36–46)
HGB BLD-MCNC: 7 G/DL (ref 12–16)
HGB BLD-MCNC: 7.4 G/DL (ref 12–16)
HGB BLD-MCNC: 7.4 G/DL (ref 12–16)
INR PPP: 1 (ref 0.9–1.1)
MAGNESIUM SERPL-MCNC: 1.71 MG/DL (ref 1.6–2.4)
MCH RBC QN AUTO: 26.4 PG (ref 26–34)
MCH RBC QN AUTO: 26.4 PG (ref 26–34)
MCHC RBC AUTO-ENTMCNC: 30.5 G/DL (ref 32–36)
MCHC RBC AUTO-ENTMCNC: 31.1 G/DL (ref 32–36)
MCV RBC AUTO: 85 FL (ref 80–100)
MCV RBC AUTO: 87 FL (ref 80–100)
NRBC BLD-RTO: 0 /100 WBCS (ref 0–0)
NRBC BLD-RTO: 0 /100 WBCS (ref 0–0)
P AXIS: 58 DEGREES
P AXIS: 69 DEGREES
P OFFSET: 214 MS
P OFFSET: 214 MS
P ONSET: 158 MS
P ONSET: 175 MS
PHOSPHATE SERPL-MCNC: 3.9 MG/DL (ref 2.5–4.9)
PLATELET # BLD AUTO: 243 X10*3/UL (ref 150–450)
PLATELET # BLD AUTO: 259 X10*3/UL (ref 150–450)
POTASSIUM SERPL-SCNC: 3.8 MMOL/L (ref 3.5–5.3)
PR INTERVAL: 106 MS
PR INTERVAL: 142 MS
PROT SERPL-MCNC: 5.7 G/DL (ref 6.4–8.2)
PROTHROMBIN TIME: 11.1 SECONDS (ref 9.8–12.8)
Q ONSET: 228 MS
Q ONSET: 229 MS
QRS COUNT: 11 BEATS
QRS COUNT: 11 BEATS
QRS DURATION: 146 MS
QRS DURATION: 148 MS
QT INTERVAL: 436 MS
QT INTERVAL: 440 MS
QTC CALCULATION(BAZETT): 460 MS
QTC CALCULATION(BAZETT): 461 MS
QTC FREDERICIA: 452 MS
QTC FREDERICIA: 454 MS
R AXIS: 188 DEGREES
R AXIS: 189 DEGREES
RBC # BLD AUTO: 2.8 X10*6/UL (ref 4–5.2)
RBC # BLD AUTO: 2.8 X10*6/UL (ref 4–5.2)
RH FACTOR (ANTIGEN D): NORMAL
SODIUM SERPL-SCNC: 140 MMOL/L (ref 136–145)
T AXIS: 63 DEGREES
T AXIS: 64 DEGREES
T OFFSET: 447 MS
T OFFSET: 448 MS
VENTRICULAR RATE: 66 BPM
VENTRICULAR RATE: 67 BPM
VIT B12 SERPL-MCNC: 319 PG/ML (ref 211–911)
WBC # BLD AUTO: 7 X10*3/UL (ref 4.4–11.3)
WBC # BLD AUTO: 8.5 X10*3/UL (ref 4.4–11.3)

## 2024-12-03 PROCEDURE — 2500000002 HC RX 250 W HCPCS SELF ADMINISTERED DRUGS (ALT 637 FOR MEDICARE OP, ALT 636 FOR OP/ED)

## 2024-12-03 PROCEDURE — 86901 BLOOD TYPING SEROLOGIC RH(D): CPT

## 2024-12-03 PROCEDURE — 36415 COLL VENOUS BLD VENIPUNCTURE: CPT

## 2024-12-03 PROCEDURE — 85027 COMPLETE CBC AUTOMATED: CPT

## 2024-12-03 PROCEDURE — 96374 THER/PROPH/DIAG INJ IV PUSH: CPT

## 2024-12-03 PROCEDURE — G0378 HOSPITAL OBSERVATION PER HR: HCPCS

## 2024-12-03 PROCEDURE — 86900 BLOOD TYPING SEROLOGIC ABO: CPT

## 2024-12-03 PROCEDURE — 84100 ASSAY OF PHOSPHORUS: CPT

## 2024-12-03 PROCEDURE — 94760 N-INVAS EAR/PLS OXIMETRY 1: CPT

## 2024-12-03 PROCEDURE — 93005 ELECTROCARDIOGRAM TRACING: CPT

## 2024-12-03 PROCEDURE — 99232 SBSQ HOSP IP/OBS MODERATE 35: CPT

## 2024-12-03 PROCEDURE — 2500000001 HC RX 250 WO HCPCS SELF ADMINISTERED DRUGS (ALT 637 FOR MEDICARE OP)

## 2024-12-03 PROCEDURE — 93010 ELECTROCARDIOGRAM REPORT: CPT | Performed by: INTERNAL MEDICINE

## 2024-12-03 PROCEDURE — 2500000004 HC RX 250 GENERAL PHARMACY W/ HCPCS (ALT 636 FOR OP/ED)

## 2024-12-03 PROCEDURE — 83735 ASSAY OF MAGNESIUM: CPT

## 2024-12-03 PROCEDURE — 80053 COMPREHEN METABOLIC PANEL: CPT

## 2024-12-03 PROCEDURE — 86923 COMPATIBILITY TEST ELECTRIC: CPT

## 2024-12-03 PROCEDURE — 85610 PROTHROMBIN TIME: CPT

## 2024-12-03 PROCEDURE — 85014 HEMATOCRIT: CPT

## 2024-12-03 RX ORDER — POLYETHYLENE GLYCOL 3350, SODIUM CHLORIDE, SODIUM BICARBONATE, POTASSIUM CHLORIDE 420; 11.2; 5.72; 1.48 G/4L; G/4L; G/4L; G/4L
4000 POWDER, FOR SOLUTION ORAL ONCE
Status: COMPLETED | OUTPATIENT
Start: 2024-12-03 | End: 2024-12-03

## 2024-12-03 ASSESSMENT — COGNITIVE AND FUNCTIONAL STATUS - GENERAL
DAILY ACTIVITIY SCORE: 24
MOBILITY SCORE: 24

## 2024-12-03 ASSESSMENT — PAIN - FUNCTIONAL ASSESSMENT
PAIN_FUNCTIONAL_ASSESSMENT: 0-10
PAIN_FUNCTIONAL_ASSESSMENT: WONG-BAKER FACES
PAIN_FUNCTIONAL_ASSESSMENT: WONG-BAKER FACES

## 2024-12-03 ASSESSMENT — PAIN SCALES - GENERAL
PAINLEVEL_OUTOF10: 0 - NO PAIN
PAINLEVEL_OUTOF10: 0 - NO PAIN

## 2024-12-03 ASSESSMENT — PAIN SCALES - WONG BAKER: WONGBAKER_NUMERICALRESPONSE: NO HURT

## 2024-12-03 NOTE — CARE PLAN
The patient's goals for the shift include to remain safe and comdortable    The clinical goals for the shift include Patient will continue to have contolled pain throughout this shift    Over the shift, the patient did not make progress toward the following goals. Barriers to progression include. Recommendations to address these barriers include.

## 2024-12-03 NOTE — PROGRESS NOTES
"Katherine Trent \"Kimberly\" is a 69 y.o. female on day 0 of admission presenting with GI bleed.    Subjective   Patient resting in the bed. No acute distress. Patient denies any more BM since admission.        Objective     Physical Exam  Vitals reviewed.   Constitutional:       Appearance: Normal appearance.   HENT:      Head: Normocephalic and atraumatic.      Mouth/Throat:      Mouth: Mucous membranes are moist.   Eyes:      Extraocular Movements: Extraocular movements intact.   Cardiovascular:      Rate and Rhythm: Normal rate and regular rhythm.      Pulses: Normal pulses.      Heart sounds: Normal heart sounds.   Pulmonary:      Effort: Pulmonary effort is normal.      Breath sounds: Normal breath sounds.   Abdominal:      General: Bowel sounds are normal. There is distension.      Tenderness: There is generalized abdominal tenderness. There is guarding.   Genitourinary:     Rectum: Normal.   Musculoskeletal:         General: No swelling. Normal range of motion.      Cervical back: Normal range of motion.   Skin:     General: Skin is warm and dry.   Neurological:      General: No focal deficit present.      Mental Status: She is alert and oriented to person, place, and time.   Psychiatric:         Mood and Affect: Mood normal.         Behavior: Behavior normal.         Last Recorded Vitals  Blood pressure 96/60, pulse 63, temperature 36.6 °C (97.9 °F), temperature source Temporal, resp. rate 18, height 1.676 m (5' 6\"), weight 66.1 kg (145 lb 12.8 oz), SpO2 100%.  Intake/Output last 3 Shifts:  No intake/output data recorded.    Relevant Results      CT abdomen pelvis w IV contrast    Result Date: 12/2/2024  STUDY: CT Abdomen and Pelvis with IV Contrast; 12/02/2024, 1:06 PM. INDICATION: Generalized abdominal pain, rectal bleeding. COMPARISON: Not available. ACCESSION NUMBER(S): [id number] ORDERING CLINICIAN: GIBRAN LANTIGUA TECHNIQUE: CT of the abdomen and pelvis was performed.  Contiguous axial images " were obtained at 3 mm slice thickness through the abdomen and pelvis. Coronal and sagittal reconstructions at 3 mm slice thickness were performed.  Omnipaque 350 75 mL was administered intravenously. FINDINGS: Partially visualized chest showing the heart to be of normal size. No pericardial effusion.  Lung bases are clear. Abdomen: No acute abnormality of the stomach is identified. The liver of normal size and contour. No intrahepatic ductal dilatation is identified.  Gallbladder collapsed and otherwise normal.  No acute abnormality of the pancreas.  Spleen of normal appearance.  Adrenal glands of normal appearance. No acute renal process. No retroperitoneal adenopathy. No findings of abdominal aortic aneurysm. No bowel obstruction.  No free air.  No free fluid.  There are a few scattered colonic diverticulum.  No findings of diverticulitis. The appendix is not identified.  There is no acute abnormality within its expected location. Pelvis: No pelvic free fluid.  No inflammatory change or findings of free air. No findings of pelvic adenopathy.  Status post hysterectomy. Skeleton: Lumbar dextroscoliotic curvature, spondylotic changes and facet arthrosis.  No acute bony process is identified.    No findings of an acute abdominal or pelvic process. There are a few scattered colonic diverticulum. No findings of diverticulitis.  No discrete abnormality of the rectosigmoid colon identified.  Consider direct visualization if there is continued clinical concern. Status post hysterectomy. Lumbar dextroscoliotic curvature, spondylotic changes and facet arthrosis. No acute bony process is identified. Signed by Landen Oakes MD    Results for orders placed or performed during the hospital encounter of 12/02/24 (from the past 24 hours)   CBC and Auto Differential   Result Value Ref Range    WBC 9.6 4.4 - 11.3 x10*3/uL    nRBC 0.0 0.0 - 0.0 /100 WBCs    RBC 3.09 (L) 4.00 - 5.20 x10*6/uL    Hemoglobin 8.1 (L) 12.0 - 16.0 g/dL     Hematocrit 26.5 (L) 36.0 - 46.0 %    MCV 86 80 - 100 fL    MCH 26.2 26.0 - 34.0 pg    MCHC 30.6 (L) 32.0 - 36.0 g/dL    RDW 15.0 (H) 11.5 - 14.5 %    Platelets 306 150 - 450 x10*3/uL    Neutrophils % 59.5 40.0 - 80.0 %    Immature Granulocytes %, Automated 0.4 0.0 - 0.9 %    Lymphocytes % 28.5 13.0 - 44.0 %    Monocytes % 8.7 2.0 - 10.0 %    Eosinophils % 2.4 0.0 - 6.0 %    Basophils % 0.5 0.0 - 2.0 %    Neutrophils Absolute 5.73 1.20 - 7.70 x10*3/uL    Immature Granulocytes Absolute, Automated 0.04 0.00 - 0.70 x10*3/uL    Lymphocytes Absolute 2.75 1.20 - 4.80 x10*3/uL    Monocytes Absolute 0.84 0.10 - 1.00 x10*3/uL    Eosinophils Absolute 0.23 0.00 - 0.70 x10*3/uL    Basophils Absolute 0.05 0.00 - 0.10 x10*3/uL   Comprehensive metabolic panel   Result Value Ref Range    Glucose 94 74 - 99 mg/dL    Sodium 140 136 - 145 mmol/L    Potassium 4.2 3.5 - 5.3 mmol/L    Chloride 101 98 - 107 mmol/L    Bicarbonate 29 21 - 32 mmol/L    Anion Gap 14 10 - 20 mmol/L    Urea Nitrogen 37 (H) 6 - 23 mg/dL    Creatinine 1.00 0.50 - 1.05 mg/dL    eGFR 61 >60 mL/min/1.73m*2    Calcium 8.9 8.6 - 10.3 mg/dL    Albumin 3.9 3.4 - 5.0 g/dL    Alkaline Phosphatase 39 33 - 136 U/L    Total Protein 6.3 (L) 6.4 - 8.2 g/dL    AST 19 9 - 39 U/L    Bilirubin, Total 0.2 0.0 - 1.2 mg/dL    ALT 16 7 - 45 U/L   Magnesium   Result Value Ref Range    Magnesium 1.78 1.60 - 2.40 mg/dL   Protime-INR   Result Value Ref Range    Protime 10.8 9.8 - 12.8 seconds    INR 1.0 0.9 - 1.1   Lipase   Result Value Ref Range    Lipase 49 9 - 82 U/L   TSH   Result Value Ref Range    Thyroid Stimulating Hormone 0.45 0.44 - 3.98 mIU/L   Reticulocytes   Result Value Ref Range    Retic % 1.9 0.5 - 2.0 %    Retic Absolute 0.060 0.017 - 0.110 x10*6/uL    Reticulocyte Hemoglobin 33 28 - 38 pg    Immature Retic fraction 31.1 (H) <=16.0 %   Ferritin   Result Value Ref Range    Ferritin 22 8 - 150 ng/mL   Iron and TIBC   Result Value Ref Range    Iron 65 35 - 150 ug/dL    UIBC  308 110 - 370 ug/dL    TIBC 373 240 - 445 ug/dL    % Saturation 17 (L) 25 - 45 %   Urinalysis with Reflex Culture and Microscopic   Result Value Ref Range    Color, Urine Light-Yellow Light-Yellow, Yellow, Dark-Yellow    Appearance, Urine Turbid (N) Clear    Specific Gravity, Urine 1.018 1.005 - 1.035    pH, Urine 7.0 5.0, 5.5, 6.0, 6.5, 7.0, 7.5, 8.0    Protein, Urine NEGATIVE NEGATIVE, 10 (TRACE), 20 (TRACE) mg/dL    Glucose, Urine Normal Normal mg/dL    Blood, Urine NEGATIVE NEGATIVE    Ketones, Urine NEGATIVE NEGATIVE mg/dL    Bilirubin, Urine NEGATIVE NEGATIVE    Urobilinogen, Urine Normal Normal mg/dL    Nitrite, Urine NEGATIVE NEGATIVE    Leukocyte Esterase, Urine NEGATIVE NEGATIVE   Extra Urine Gray Tube   Result Value Ref Range    Extra Tube Hold for add-ons.    CBC   Result Value Ref Range    WBC 8.0 4.4 - 11.3 x10*3/uL    nRBC 0.0 0.0 - 0.0 /100 WBCs    RBC 2.89 (L) 4.00 - 5.20 x10*6/uL    Hemoglobin 7.5 (L) 12.0 - 16.0 g/dL    Hematocrit 25.2 (L) 36.0 - 46.0 %    MCV 87 80 - 100 fL    MCH 26.0 26.0 - 34.0 pg    MCHC 29.8 (L) 32.0 - 36.0 g/dL    RDW 15.2 (H) 11.5 - 14.5 %    Platelets 235 150 - 450 x10*3/uL   Vitamin B12   Result Value Ref Range    Vitamin B12 319 211 - 911 pg/mL   Folate   Result Value Ref Range    Folate, Serum >24.0 >5.0 ng/mL   Comprehensive Metabolic Panel   Result Value Ref Range    Glucose 87 74 - 99 mg/dL    Sodium 140 136 - 145 mmol/L    Potassium 3.8 3.5 - 5.3 mmol/L    Chloride 103 98 - 107 mmol/L    Bicarbonate 28 21 - 32 mmol/L    Anion Gap 13 10 - 20 mmol/L    Urea Nitrogen 22 6 - 23 mg/dL    Creatinine 1.00 0.50 - 1.05 mg/dL    eGFR 61 >60 mL/min/1.73m*2    Calcium 8.7 8.6 - 10.3 mg/dL    Albumin 3.5 3.4 - 5.0 g/dL    Alkaline Phosphatase 35 33 - 136 U/L    Total Protein 5.7 (L) 6.4 - 8.2 g/dL    AST 26 9 - 39 U/L    Bilirubin, Total 0.3 0.0 - 1.2 mg/dL    ALT 18 7 - 45 U/L   Magnesium   Result Value Ref Range    Magnesium 1.71 1.60 - 2.40 mg/dL   Phosphorus   Result Value  Ref Range    Phosphorus 3.9 2.5 - 4.9 mg/dL   Protime-INR   Result Value Ref Range    Protime 11.1 9.8 - 12.8 seconds    INR 1.0 0.9 - 1.1   Type and screen   Result Value Ref Range    ABO TYPE O     Rh TYPE POS     ANTIBODY SCREEN NEG    CBC   Result Value Ref Range    WBC 7.0 4.4 - 11.3 x10*3/uL    nRBC 0.0 0.0 - 0.0 /100 WBCs    RBC 2.80 (L) 4.00 - 5.20 x10*6/uL    Hemoglobin 7.4 (L) 12.0 - 16.0 g/dL    Hematocrit 23.8 (L) 36.0 - 46.0 %    MCV 85 80 - 100 fL    MCH 26.4 26.0 - 34.0 pg    MCHC 31.1 (L) 32.0 - 36.0 g/dL    RDW 15.6 (H) 11.5 - 14.5 %    Platelets 243 150 - 450 x10*3/uL      Scheduled medications  atenolol, 50 mg, oral, BID  escitalopram, 20 mg, oral, Daily  pantoprazole, 40 mg, oral, BID AC   Or  esomeprazole, 40 mg, nasoduodenal tube, BID AC   Or  pantoprazole, 40 mg, intravenous, BID AC  levothyroxine, 75 mcg, oral, Daily  venlafaxine, 37.5 mg, oral, Daily  zolpidem, 10 mg, oral, Nightly      Continuous medications     PRN medications  PRN medications: acetaminophen **OR** acetaminophen **OR** acetaminophen, albuterol, sennosides-docusate sodium          Assessment/Plan   Assessment & Plan  GI bleed    Endorses continued pain and discomfort in RLQ and LLQ with generalized distention and bloating. Denies nausea, emesis. Patient endorses that she takes Aleve multiple times a day for headaches recently.    patient had BM this afternoon, upon visualization stool is soft melena with BRB in the toilet and patient endorses with wiping.     Plan   -Colonoscopy and EGD tomorrow  -Start Golytely prep this evening at 1700  -clear liquids today, NPO at midnight   -continue to trend hgb, replace <7    Plan discussed with Dr King  GI will continue to follow.          Lachelle Gutierrez, APRN-CNP

## 2024-12-03 NOTE — PROGRESS NOTES
"Katherine Trent \"Kimberly\" is a 69 y.o. female on day 0 of admission presenting with GI bleed.      Subjective   Patient was seen and examined at bedside. No acute events overnight. Patient denies new or worsening symptoms.  Patient reports her headache is resolved, has not had a bowel movement since yesterday.  Denies all pain at rest. Abd tender to palpation, interval improvement since yesterday.    Objective     Last Recorded Vitals  /68 (BP Location: Right arm, Patient Position: Lying)   Pulse 67   Temp 36.9 °C (98.4 °F) (Temporal)   Resp 18   Wt 66.1 kg (145 lb 12.8 oz)   SpO2 90%   Intake/Output last 3 Shifts:  No intake or output data in the 24 hours ending 12/03/24 0750    Admission Weight  Weight: 64.4 kg (142 lb) (12/02/24 1138)    Daily Weight  12/02/24 : 66.1 kg (145 lb 12.8 oz)    Image Results  CT abdomen pelvis w IV contrast  Narrative: STUDY:  CT Abdomen and Pelvis with IV Contrast; 12/02/2024, 1:06 PM.  INDICATION:  Generalized abdominal pain, rectal bleeding.  COMPARISON:  Not available.  ACCESSION NUMBER(S):  [id number]  ORDERING CLINICIAN:  GIBRAN LANTIGUA  TECHNIQUE:  CT of the abdomen and pelvis was performed.  Contiguous axial images  were obtained at 3 mm slice thickness through the abdomen and pelvis.   Coronal and sagittal reconstructions at 3 mm slice thickness were  performed.  Omnipaque 350 75 mL was administered intravenously.  FINDINGS:  Partially visualized chest showing the heart to be of normal size. No  pericardial effusion.  Lung bases are clear.  Abdomen:  No acute abnormality of the stomach is identified.  The liver of normal size and contour. No intrahepatic ductal  dilatation is identified.  Gallbladder collapsed and otherwise normal.     No acute abnormality of the pancreas.    Spleen of normal appearance.    Adrenal glands of normal appearance.  No acute renal process.   No retroperitoneal adenopathy. No findings of abdominal aortic  aneurysm.   No bowel " obstruction.  No free air.  No free fluid.  There are a few  scattered colonic diverticulum.  No findings of diverticulitis.  The appendix is not identified.  There is no acute abnormality within  its expected location.  Pelvis:  No pelvic free fluid.  No inflammatory change or findings of free air.  No findings of pelvic adenopathy.  Status post hysterectomy.  Skeleton:  Lumbar dextroscoliotic curvature, spondylotic changes and facet  arthrosis.  No acute bony process is identified.  Impression: No findings of an acute abdominal or pelvic process.  There are a few scattered colonic diverticulum. No findings of  diverticulitis.  No discrete abnormality of the rectosigmoid colon  identified.  Consider direct visualization if there is continued  clinical concern.  Status post hysterectomy.  Lumbar dextroscoliotic curvature, spondylotic changes and facet  arthrosis. No acute bony process is identified.  Signed by Landen Oakes MD      Physical Exam  Constitutional:       General: She is not in acute distress.  Cardiovascular:      Rate and Rhythm: Normal rate.      Heart sounds:      No friction rub. No gallop.   Pulmonary:      Effort: Pulmonary effort is normal. No respiratory distress.   Abdominal:      General: There is distension.      Tenderness: There is abdominal tenderness.      Comments: Interval improvement in tenderness   Musculoskeletal:      Right lower leg: No edema.      Left lower leg: No edema.   Neurological:      Mental Status: She is alert. Mental status is at baseline.         Relevant Results  CT abdomen pelvis w IV contrast    Result Date: 12/2/2024  STUDY: CT Abdomen and Pelvis with IV Contrast; 12/02/2024, 1:06 PM. INDICATION: Generalized abdominal pain, rectal bleeding. COMPARISON: Not available. ACCESSION NUMBER(S): [id number] ORDERING CLINICIAN: GIBRAN LANTIGUA TECHNIQUE: CT of the abdomen and pelvis was performed.  Contiguous axial images were obtained at 3 mm slice thickness  through the abdomen and pelvis. Coronal and sagittal reconstructions at 3 mm slice thickness were performed.  Omnipaque 350 75 mL was administered intravenously. FINDINGS: Partially visualized chest showing the heart to be of normal size. No pericardial effusion.  Lung bases are clear. Abdomen: No acute abnormality of the stomach is identified. The liver of normal size and contour. No intrahepatic ductal dilatation is identified.  Gallbladder collapsed and otherwise normal.  No acute abnormality of the pancreas.  Spleen of normal appearance.  Adrenal glands of normal appearance. No acute renal process. No retroperitoneal adenopathy. No findings of abdominal aortic aneurysm. No bowel obstruction.  No free air.  No free fluid.  There are a few scattered colonic diverticulum.  No findings of diverticulitis. The appendix is not identified.  There is no acute abnormality within its expected location. Pelvis: No pelvic free fluid.  No inflammatory change or findings of free air. No findings of pelvic adenopathy.  Status post hysterectomy. Skeleton: Lumbar dextroscoliotic curvature, spondylotic changes and facet arthrosis.  No acute bony process is identified.    No findings of an acute abdominal or pelvic process. There are a few scattered colonic diverticulum. No findings of diverticulitis.  No discrete abnormality of the rectosigmoid colon identified.  Consider direct visualization if there is continued clinical concern. Status post hysterectomy. Lumbar dextroscoliotic curvature, spondylotic changes and facet arthrosis. No acute bony process is identified. Signed by Landen Oakes MD     Assessment/Plan   Assessment & Plan  GI bleed      Kimberly Trent is a 69 y.o. female with PMH of HTN, ADD, depression/anxiety, insomnia, hypothyroidism, postmenopausal hypogonadism , presenting with weakness abdominal pain and rectal bleeding.     ACUTE ISSUES:  # Abdominal pain  # Bloody bowel movement  # Anemia; Weakness  #  GERD  - Etiology of weakness is likely from anemia. Suspecting GI losses in light of abdominal pain, heartburn, and bloody bowel movement (1x).   PLAN:  -GI consulted: Okay for clear liquid diet then n.p.o. at midnight, plan for colonoscopy tomorrow  -Monitor hemoglobin: 8.1 on admission, --->  7.5, 7.4   -Patient hypotensive, holding atenolol  -Consented for blood transfusion; type and screen;   -Transfuse <7  -Start PPI BID    #? Hx of Afib  #? Hx of syncope  # HTN  - Patient reports history of Afib and syncope. States that she had seen a cardiologist 6+ years ago for this issue, and eventually stopped following up.  -Reports frequent falls in the past.  PLAN:  - Start Tele, monitor for arrhythmia  - Holding home atenolol  - Cardiology referral on discharge     #Headache (resolved)  - Patient states she has long history of chronic headaches. States she takes OTC Excedrin daily at home.  PLAN:  - Given Diphenhydramine, prochlorperazine, and Tylenol         CHRONIC ISSUES:  # Hypothyroidism  - s/p thyroidectomy for malignancy (10+ years ago)  - continue home levothyroxine  - Small tender nodule felt on physical exam. Recommend outpatient ultrasound.  - Order TSH     # ADD- continue home Adderall     # Depression/anxiety- continue home Lexapro and Effexor     # Insomnia- continue home Ambien              -No past sleep study. Chronic Ambien use for insomnia.              -Reports poor sleep quality and fatigue.              -Etiology: Polypharmacy (adderall 30mg BID) vs MAYA              -Sleep Medicine referral on discharge        Fluid: Replete PRN  Electrolytes: Replete PRN  Nutrition: Clears, then n.p.o. midnight  GI PPx: PPI twice daily  DVT/PE PPx: SCD  Abx: -  IV Lines: PIV  O2: RA     Disposition: Admit to observation for significant abdominal pain, weakness and bloody bowel movement-monitoring hemoglobin & Pending upper & lower scope, eLOS >48h  Code Status: Full  Josephine Lopez DO, PGY-1  Internal  Medicine   12/3/24 at 7:52 AM.

## 2024-12-04 ENCOUNTER — ANESTHESIA EVENT (OUTPATIENT)
Dept: OPERATING ROOM | Facility: HOSPITAL | Age: 69
End: 2024-12-04
Payer: MEDICARE

## 2024-12-04 ENCOUNTER — ANESTHESIA (OUTPATIENT)
Dept: OPERATING ROOM | Facility: HOSPITAL | Age: 69
End: 2024-12-04
Payer: MEDICARE

## 2024-12-04 ENCOUNTER — APPOINTMENT (OUTPATIENT)
Dept: OPERATING ROOM | Facility: HOSPITAL | Age: 69
End: 2024-12-04
Payer: MEDICARE

## 2024-12-04 PROBLEM — K92.2 GASTROINTESTINAL HEMORRHAGE, UNSPECIFIED GASTROINTESTINAL HEMORRHAGE TYPE: Status: ACTIVE | Noted: 2024-12-04

## 2024-12-04 LAB
ABO GROUP (TYPE) IN BLOOD: NORMAL
ALBUMIN SERPL BCP-MCNC: 3.5 G/DL (ref 3.4–5)
ALP SERPL-CCNC: 37 U/L (ref 33–136)
ALT SERPL W P-5'-P-CCNC: 18 U/L (ref 7–45)
ANION GAP SERPL CALC-SCNC: 13 MMOL/L (ref 10–20)
AST SERPL W P-5'-P-CCNC: 23 U/L (ref 9–39)
BILIRUB SERPL-MCNC: 0.3 MG/DL (ref 0–1.2)
BUN SERPL-MCNC: 16 MG/DL (ref 6–23)
CALCIUM SERPL-MCNC: 8.6 MG/DL (ref 8.6–10.3)
CHLORIDE SERPL-SCNC: 102 MMOL/L (ref 98–107)
CO2 SERPL-SCNC: 29 MMOL/L (ref 21–32)
CREAT SERPL-MCNC: 1.02 MG/DL (ref 0.5–1.05)
EGFRCR SERPLBLD CKD-EPI 2021: 59 ML/MIN/1.73M*2
ERYTHROCYTE [DISTWIDTH] IN BLOOD BY AUTOMATED COUNT: 15.8 % (ref 11.5–14.5)
GLUCOSE SERPL-MCNC: 91 MG/DL (ref 74–99)
HCT VFR BLD AUTO: 22.1 % (ref 36–46)
HCT VFR BLD AUTO: 23 % (ref 36–46)
HGB BLD-MCNC: 6.9 G/DL (ref 12–16)
HGB BLD-MCNC: 7 G/DL (ref 12–16)
MAGNESIUM SERPL-MCNC: 1.7 MG/DL (ref 1.6–2.4)
MCH RBC QN AUTO: 26.2 PG (ref 26–34)
MCHC RBC AUTO-ENTMCNC: 30.4 G/DL (ref 32–36)
MCV RBC AUTO: 86 FL (ref 80–100)
NRBC BLD-RTO: 0 /100 WBCS (ref 0–0)
PHOSPHATE SERPL-MCNC: 3.6 MG/DL (ref 2.5–4.9)
PLATELET # BLD AUTO: 253 X10*3/UL (ref 150–450)
POTASSIUM SERPL-SCNC: 4 MMOL/L (ref 3.5–5.3)
PROT SERPL-MCNC: 6 G/DL (ref 6.4–8.2)
RBC # BLD AUTO: 2.67 X10*6/UL (ref 4–5.2)
RH FACTOR (ANTIGEN D): NORMAL
SODIUM SERPL-SCNC: 140 MMOL/L (ref 136–145)
WBC # BLD AUTO: 6.5 X10*3/UL (ref 4.4–11.3)

## 2024-12-04 PROCEDURE — 2500000001 HC RX 250 WO HCPCS SELF ADMINISTERED DRUGS (ALT 637 FOR MEDICARE OP)

## 2024-12-04 PROCEDURE — P9016 RBC LEUKOCYTES REDUCED: HCPCS

## 2024-12-04 PROCEDURE — 80053 COMPREHEN METABOLIC PANEL: CPT

## 2024-12-04 PROCEDURE — 2720000007 HC OR 272 NO HCPCS: Performed by: ANESTHESIOLOGY

## 2024-12-04 PROCEDURE — 2500000004 HC RX 250 GENERAL PHARMACY W/ HCPCS (ALT 636 FOR OP/ED): Performed by: NURSE ANESTHETIST, CERTIFIED REGISTERED

## 2024-12-04 PROCEDURE — 94760 N-INVAS EAR/PLS OXIMETRY 1: CPT

## 2024-12-04 PROCEDURE — 7100000002 HC RECOVERY ROOM TIME - EACH INCREMENTAL 1 MINUTE: Performed by: ANESTHESIOLOGY

## 2024-12-04 PROCEDURE — 99232 SBSQ HOSP IP/OBS MODERATE 35: CPT

## 2024-12-04 PROCEDURE — 84100 ASSAY OF PHOSPHORUS: CPT

## 2024-12-04 PROCEDURE — 96376 TX/PRO/DX INJ SAME DRUG ADON: CPT

## 2024-12-04 PROCEDURE — 3700000001 HC GENERAL ANESTHESIA TIME - INITIAL BASE CHARGE: Performed by: ANESTHESIOLOGY

## 2024-12-04 PROCEDURE — 83735 ASSAY OF MAGNESIUM: CPT

## 2024-12-04 PROCEDURE — 7100000001 HC RECOVERY ROOM TIME - INITIAL BASE CHARGE: Performed by: ANESTHESIOLOGY

## 2024-12-04 PROCEDURE — 88305 TISSUE EXAM BY PATHOLOGIST: CPT | Mod: TC,GEALAB | Performed by: INTERNAL MEDICINE

## 2024-12-04 PROCEDURE — A43239 PR EDG TRANSORAL BIOPSY SINGLE/MULTIPLE: Performed by: ANESTHESIOLOGY

## 2024-12-04 PROCEDURE — 0DB78ZX EXCISION OF STOMACH, PYLORUS, VIA NATURAL OR ARTIFICIAL OPENING ENDOSCOPIC, DIAGNOSTIC: ICD-10-PCS | Performed by: INTERNAL MEDICINE

## 2024-12-04 PROCEDURE — 36430 TRANSFUSION BLD/BLD COMPNT: CPT

## 2024-12-04 PROCEDURE — 3600000002 HC OR TIME - INITIAL BASE CHARGE - PROCEDURE LEVEL TWO: Performed by: ANESTHESIOLOGY

## 2024-12-04 PROCEDURE — P9045 ALBUMIN (HUMAN), 5%, 250 ML: HCPCS | Mod: JZ | Performed by: NURSE ANESTHETIST, CERTIFIED REGISTERED

## 2024-12-04 PROCEDURE — 3700000002 HC GENERAL ANESTHESIA TIME - EACH INCREMENTAL 1 MINUTE: Performed by: ANESTHESIOLOGY

## 2024-12-04 PROCEDURE — 2500000005 HC RX 250 GENERAL PHARMACY W/O HCPCS: Performed by: NURSE ANESTHETIST, CERTIFIED REGISTERED

## 2024-12-04 PROCEDURE — 0DBN8ZX EXCISION OF SIGMOID COLON, VIA NATURAL OR ARTIFICIAL OPENING ENDOSCOPIC, DIAGNOSTIC: ICD-10-PCS | Performed by: INTERNAL MEDICINE

## 2024-12-04 PROCEDURE — 2500000004 HC RX 250 GENERAL PHARMACY W/ HCPCS (ALT 636 FOR OP/ED)

## 2024-12-04 PROCEDURE — 36415 COLL VENOUS BLD VENIPUNCTURE: CPT

## 2024-12-04 PROCEDURE — 7100000009 HC PHASE TWO TIME - INITIAL BASE CHARGE: Performed by: ANESTHESIOLOGY

## 2024-12-04 PROCEDURE — 2500000002 HC RX 250 W HCPCS SELF ADMINISTERED DRUGS (ALT 637 FOR MEDICARE OP, ALT 636 FOR OP/ED)

## 2024-12-04 PROCEDURE — 88305 TISSUE EXAM BY PATHOLOGIST: CPT | Performed by: STUDENT IN AN ORGANIZED HEALTH CARE EDUCATION/TRAINING PROGRAM

## 2024-12-04 PROCEDURE — 1200000002 HC GENERAL ROOM WITH TELEMETRY DAILY

## 2024-12-04 PROCEDURE — 3600000007 HC OR TIME - EACH INCREMENTAL 1 MINUTE - PROCEDURE LEVEL TWO: Performed by: ANESTHESIOLOGY

## 2024-12-04 PROCEDURE — A43239 PR EDG TRANSORAL BIOPSY SINGLE/MULTIPLE: Performed by: NURSE ANESTHETIST, CERTIFIED REGISTERED

## 2024-12-04 PROCEDURE — 85027 COMPLETE CBC AUTOMATED: CPT

## 2024-12-04 PROCEDURE — 85014 HEMATOCRIT: CPT

## 2024-12-04 PROCEDURE — 45385 COLONOSCOPY W/LESION REMOVAL: CPT | Performed by: INTERNAL MEDICINE

## 2024-12-04 PROCEDURE — 43239 EGD BIOPSY SINGLE/MULTIPLE: CPT | Performed by: INTERNAL MEDICINE

## 2024-12-04 RX ORDER — PROPOFOL 10 MG/ML
INJECTION, EMULSION INTRAVENOUS CONTINUOUS PRN
Status: DISCONTINUED | OUTPATIENT
Start: 2024-12-04 | End: 2024-12-04

## 2024-12-04 RX ORDER — VASOPRESSIN 20 U/ML
INJECTION PARENTERAL AS NEEDED
Status: DISCONTINUED | OUTPATIENT
Start: 2024-12-04 | End: 2024-12-04

## 2024-12-04 RX ORDER — MIDAZOLAM HYDROCHLORIDE 1 MG/ML
INJECTION INTRAMUSCULAR; INTRAVENOUS AS NEEDED
Status: DISCONTINUED | OUTPATIENT
Start: 2024-12-04 | End: 2024-12-04

## 2024-12-04 RX ORDER — LIDOCAINE HCL/PF 100 MG/5ML
SYRINGE (ML) INTRAVENOUS AS NEEDED
Status: DISCONTINUED | OUTPATIENT
Start: 2024-12-04 | End: 2024-12-04

## 2024-12-04 RX ORDER — SODIUM CHLORIDE, SODIUM LACTATE, POTASSIUM CHLORIDE, CALCIUM CHLORIDE 600; 310; 30; 20 MG/100ML; MG/100ML; MG/100ML; MG/100ML
INJECTION, SOLUTION INTRAVENOUS CONTINUOUS PRN
Status: DISCONTINUED | OUTPATIENT
Start: 2024-12-04 | End: 2024-12-04

## 2024-12-04 RX ORDER — FENTANYL CITRATE 50 UG/ML
INJECTION, SOLUTION INTRAMUSCULAR; INTRAVENOUS AS NEEDED
Status: DISCONTINUED | OUTPATIENT
Start: 2024-12-04 | End: 2024-12-04

## 2024-12-04 RX ORDER — PHENYLEPHRINE HCL IN 0.9% NACL 0.4MG/10ML
SYRINGE (ML) INTRAVENOUS AS NEEDED
Status: DISCONTINUED | OUTPATIENT
Start: 2024-12-04 | End: 2024-12-04

## 2024-12-04 RX ORDER — ALBUMIN HUMAN 50 G/1000ML
SOLUTION INTRAVENOUS AS NEEDED
Status: DISCONTINUED | OUTPATIENT
Start: 2024-12-04 | End: 2024-12-04

## 2024-12-04 SDOH — HEALTH STABILITY: MENTAL HEALTH: CURRENT SMOKER: 0

## 2024-12-04 ASSESSMENT — COGNITIVE AND FUNCTIONAL STATUS - GENERAL
DAILY ACTIVITIY SCORE: 24
MOBILITY SCORE: 24
DAILY ACTIVITIY SCORE: 24
MOBILITY SCORE: 24

## 2024-12-04 ASSESSMENT — PAIN - FUNCTIONAL ASSESSMENT
PAIN_FUNCTIONAL_ASSESSMENT: 0-10

## 2024-12-04 ASSESSMENT — PAIN SCALES - GENERAL
PAINLEVEL_OUTOF10: 0 - NO PAIN
PAINLEVEL_OUTOF10: 7
PAINLEVEL_OUTOF10: 0 - NO PAIN

## 2024-12-04 NOTE — PROGRESS NOTES
"Katherine Trent \"Kimberly\" is a 69 y.o. female on day 0 of admission presenting with GI bleed.      Subjective   Patient was seen and examined at bedside. No acute events overnight. Patient denies new or worsening symptoms. Patient pending upper and lower scope today.    Objective     Last Recorded Vitals  /69   Pulse 71   Temp 36.8 °C (98.2 °F) (Temporal)   Resp 18   Wt 66.1 kg (145 lb 12.8 oz)   SpO2 100%   Intake/Output last 3 Shifts:    Intake/Output Summary (Last 24 hours) at 12/4/2024 0756  Last data filed at 12/4/2024 0743  Gross per 24 hour   Intake 600 ml   Output --   Net 600 ml       Admission Weight  Weight: 64.4 kg (142 lb) (12/02/24 1138)    Daily Weight  12/02/24 : 66.1 kg (145 lb 12.8 oz)    Image Results  ECG 12 lead  Normal sinus rhythm  Right bundle branch block  Abnormal ECG  When compared with ECG of 22-MAR-2024 11:22,  Fusion complexes are no longer Present  Criteria for Septal infarct are no longer Present  ECG 12 lead  Sinus rhythm with short WV  Right bundle branch block  Septal infarct (cited on or before 22-MAR-2024)  Abnormal ECG  When compared with ECG of 22-MAR-2024 11:22,  Fusion complexes are no longer Present  WV interval has decreased  Questionable change in initial forces of Septal leads      Physical Exam  Constitutional:       General: She is not in acute distress.  Cardiovascular:      Rate and Rhythm: Normal rate.      Heart sounds:      No friction rub. No gallop.   Pulmonary:      Effort: Pulmonary effort is normal. No respiratory distress.   Musculoskeletal:      Right lower leg: No edema.      Left lower leg: No edema.   Neurological:      Mental Status: She is alert. Mental status is at baseline.   Psychiatric:         Mood and Affect: Mood normal.         Behavior: Behavior normal.         Relevant Results  Colonoscopy Diagnostic    Result Date: 12/4/2024  Table formatting from the original result was not included. Impression Subcentimeter polyp in the " rectosigmoid was removed with cold snare; placed 1 clip successfully; hemostasis achieved Diverticulosis in the sigmoid colon Hemorrhoids The terminal ileum, ileocecal valve and cecum appeared normal.      No old/fresh bleeding up to terminal ileum. Findings One polyp measuring smaller than 5 mm in the rectosigmoid; performed cold snare with complete en bloc removal and retrieved specimen; placed 1 clip successfully (clip is MRI compatible); hemostasis achieved Few small diverticula in the sigmoid colon Internal (grade 1) hemorrhoids observed during retroflexion; no bleeding was observed. Small hypertrophied anal papula External small hemorrhoids All observed locations appeared normal, including the terminal ileum, ileocecal valve and cecum.  Recommendation Await pathology results Repeat colonoscopy in 7 years, due: 12/3/2031 Avoid NSAIDs. Monitor CBC See EGD report. GI bleeding likely due to duodenal ulcers  Indication Gastrointestinal hemorrhage, unspecified gastrointestinal hemorrhage type no previous colonoscopy Melena/hematochezia. Staff Staff Role Kris King MD Proceduralist Medications See Anesthesia Record. Preprocedure A history and physical has been performed, and patient medication allergies have been reviewed. The patient's tolerance of previous anesthesia has been reviewed. The risks and benefits of the procedure and the sedation options and risks were discussed with the patient. All questions were answered and informed consent obtained. Details of the Procedure The patient underwent monitored anesthesia care, which was administered by an anesthesia professional. The patient's blood pressure, ECG, ETCO2, heart rate, level of consciousness, oxygen and respirations were monitored throughout the procedure. A digital rectal exam was performed. The scope was introduced through the anus and advanced to the terminal ileum. Retroflexion was performed in the rectum. Bowel prep was adequate. The  patient's estimated blood loss was minimal (<5 mL). The procedure was not difficult. The patient tolerated the procedure well. There were no apparent adverse events. Events Procedure Events Event Event Time ENDO SCOPE IN TIME 12/4/2024 11:38 AM ENDO SCOPE OUT TIME 12/4/2024 11:43 AM ENDO SCOPE IN TIME 12/4/2024 11:52 AM ENDO CECUM REACHED 12/4/2024 11:57 AM ENDO SCOPE OUT TIME 12/4/2024 12:10 PM Specimens ID Type Source Tests Collected by Time 1 :  Tissue ANTRUM BODY BIOPSY SURGICAL PATHOLOGY EXAM Nasir Epps RN 12/4/2024 1142 2 : POLYPECTOMY Tissue RECTO-SIGMOID BIOPSY SURGICAL PATHOLOGY EXAM Nasir Epps RN 12/4/2024 1205 Procedure Location [facility] [address] [facility] [address] [place] [address] [phone] Referring Provider Lachelle Gutierrez, APRN-CNP Procedure Provider MD Lachelle Grace     Esophagogastroduodenoscopy (EGD)    Result Date: 12/4/2024  Table formatting from the original result was not included. Impression Small type I hiatal hernia Mild erythematous mucosa in the antrum; performed cold forceps biopsy to rule out H. pylori Single ulcer in the duodenal bulb Single ulcer in the 1st part of the duodenum Narrowing at D1 due to underlying edema, inflammation.  Unable to evaluate D2.  No active bleeding noted in the examined area Ring in the GE junction Findings Regular Z-line 37 cm from the incisors Small sliding hiatal hernia (type I hiatal hernia) without Willie lesions present, confirmed by retroflexion Mild erythematous mucosa in the antrum; performed cold forceps biopsy to rule out H. pylori Single 15 mm superficial, linear ulcer in the duodenal bulb Single 10 mm superficial, linear ulcer in the 1st part of the duodenum Narrowing at D1 due to underlying edema, inflammation.  Unable to evaluate D2.  No active bleeding noted in the examined area Non-obstructing ring in the GE junction Recommendation Await pathology results  Monitor CBC. PPI twice daily. Avoid NSAIDs. See colonoscopy report  Indication Gastrointestinal hemorrhage with melena no previous EGD GERD Chronic NSAIDs use Staff Staff Role Kris King MD Proceduralist Medications See Anesthesia Record. Preprocedure A history and physical has been performed, and patient medication allergies have been reviewed. The patient's tolerance of previous anesthesia has been reviewed. The risks and benefits of the procedure and the sedation options and risks were discussed with the patient. All questions were answered and informed consent obtained. Details of the Procedure The patient underwent monitored anesthesia care, which was administered by an anesthesia professional. The patient's blood pressure, ECG, ETCO2, heart rate, level of consciousness, oxygen and respirations were monitored throughout the procedure. The scope was introduced through the mouth and advanced to the second part of the duodenum. Retroflexion was performed in the cardia. Prior to the procedure, the patient's H. Pylori status was unknown. The patient's estimated blood loss was minimal (<5 mL). The procedure was difficult due to altered anatomy. The patient tolerated the procedure well. There were no apparent adverse events. Events Procedure Events Event Event Time ENDO SCOPE IN TIME 12/4/2024 11:38 AM ENDO SCOPE OUT TIME 12/4/2024 11:43 AM Specimens ID Type Source Tests Collected by Time 1 :  Tissue ANTRUM BODY BIOPSY SURGICAL PATHOLOGY EXAM Nasir Epps RN 12/4/2024 1142 Procedure Location [address] [address] [facility] [address] [phone] Referring Provider Lachelle Gutierrez, APRN-CNP Procedure Provider Kris King MD     ECG 12 lead    Result Date: 12/3/2024  Sinus rhythm with short OR Right bundle branch block Septal infarct (cited on or before 22-MAR-2024) Abnormal ECG When compared with ECG of 22-MAR-2024 11:22, Fusion complexes  are no longer Present PA interval has decreased Questionable change in initial forces of Septal leads     Assessment/Plan   Assessment & Plan  GI bleed      Kimberly Trent is a 69 y.o. female with PMH of HTN, ADD, depression/anxiety, insomnia, hypothyroidism, postmenopausal hypogonadism , presenting with weakness abdominal pain and rectal bleeding.     ACUTE ISSUES:  # Abdominal pain  # Bloody bowel movement  # Anemia; Weakness  # GERD  - Etiology of weakness is likely from anemia. Suspecting GI losses in light of abdominal pain, heartburn, and bloody bowel movement (1x).   PLAN:  -GI consulted: Plan for Upper & Lower scope today. NPO.   -Monitor hemoglobin               -Patient hypotensive, holding atenolol  -Consented for blood transfusion; type and screen;   -Transfuse <7  -Start PPI BID     #? Hx of Afib  #? Hx of syncope  # HTN  - Patient reports history of Afib and syncope. States that she had seen a cardiologist 6+ years ago for this issue, and eventually stopped following up.  -Reports frequent falls in the past.  PLAN:  - Start Tele, monitor for arrhythmia  - Holding home atenolol  - Cardiology referral on discharge      #Headache (resolved)  - Patient states she has long history of chronic headaches. States she takes OTC Excedrin daily at home.  PLAN:  - Given Diphenhydramine, prochlorperazine, and Tylenol         CHRONIC ISSUES:  # Hypothyroidism  - s/p thyroidectomy for malignancy (10+ years ago)  - continue home levothyroxine  - Small tender nodule felt on physical exam. Recommend outpatient ultrasound.  - Order TSH     # ADD- continue home Adderall     # Depression/anxiety- continue home Lexapro and Effexor     # Insomnia- continue home Ambien              -No past sleep study. Chronic Ambien use for insomnia.              -Reports poor sleep quality and fatigue.              -Etiology: Polypharmacy (adderall 30mg BID) vs MAYA              -Sleep Medicine referral on discharge        Fluid: Replete  PRN  Electrolytes: Replete PRN  Nutrition: NPO  GI PPx: PPI twice daily  DVT/PE PPx: SCD  Abx: -  IV Lines: PIV  O2: RA     Disposition: Admitted for significant abdominal pain, weakness and bloody bowel movement-monitoring hemoglobin & Pending upper & lower scope today, eLOS >48h  Code Status: Full    Josephine Lopez DO, PGY-1  Internal Medicine   12/4/24 at 7:57 AM.

## 2024-12-04 NOTE — HOSPITAL COURSE
Kimberly Trent is a 69 y.o. female with PMH of HTN, ADD, depression/anxiety, insomnia, hypothyroidism, postmenopausal hypogonadism , presenting with weakness abdominal pain and rectal bleeding.     Patient presents complaining of severe abdominal pain which worsened last night (12/1/2024).  She endorses a 3-month history of abdominal pain which she attributes to her GERD.  She explains that her pain is usually relieved with lying on her left side, and is generally responsive to omeprazole. She states the pain has gotten so severe at times that it has brought her to tears.   Last night she reports her pain had significantly worsened to that point-she describes the character of the pain as generalized all over her belly.  At 8:00 PM 12/2 she reports using the bathroom to try and relieve her pain-she noted bright red blood per rectum that filled the toilet.  She also reports that she had been feeling progressively fatigued during the last 3 months as well yet this was the first bowel movement that she noted with bright red blood. She states she experienced some relief after her bloody bowel movement.     Patient also endorses a history of palpitations, and states she was started on atenolol many years ago by her cardiologist that she had stopped seeing 6 years ago.  She reports never following up since.     Patient currently also endorses a headache,-she reports a history of chronic headaches and states that she takes Excedrin almost daily to treat them.     She reports hypothyroidism following a thyroidectomy 10+ years ago for a malignancy.  Currently endorses a tender spot/nodule on her anterior left neck.     Admits cold intolerance, headache, abdominal pain, weakness, malaise, palpitations, paresthesias (bilateral hands-attributes to Raynaud's)  Denies constipation, diarrhea, chest pain, shortness of breath, visual changes        ED course:  Lab work obtained, notable for 8.1 Hgb (down from 11  4 months ago).   CTAP was obtained largely unremarkable aside from some diverticuli, negative for diverticulitis.  No signs of active bleed.  Rectal exam in ED normal, no external hemorrhoids, no palpable internal hemorrhoids, no frankly bloody stool.      Hospital Course:  Patient was admitted, GI was consulted,  Hemoglobin was trended, type and screen, consent obtained.  Patient started on twice daily PPI. Patient kept n.p.o., GI planning upper and lower scope for 12/4/2024. Scope shows small type I hiatal hernia, Mild erythematous mucosa in the antrum; performed cold forceps biopsy to rule out H. Pylori, Single ulcer in the duodenal bulb, Single ulcer in the 1st part of the duodenum, Narrowing at D1 due to underlying edema, inflammation.  Unable to evaluate D2.  No active bleeding noted in the examined area, Ring in the GE junction. Due to low hgb patient was given 1u of blood. Patient noted significant improvement in energy after transfusion.    HDS for discharge.

## 2024-12-04 NOTE — PROGRESS NOTES
12/04/24 1027   Discharge Planning   Living Arrangements Alone   Support Systems Children   Assistance Needed A&OX4; independent with ADLs with no DME; drives; room air baseline and currently room air; PCP is Dr Presley Eugene   Type of Residence Private residence   Number of Stairs to Enter Residence 0   Number of Stairs Within Residence 28   Do you have animals or pets at home? Yes   Type of Animals or Pets 2 dogs   Home or Post Acute Services None   Expected Discharge Disposition Home   Does the patient need discharge transport arranged? No   Stroke Family Assessment   Stroke Family Assessment Needed No   Intensity of Service   Intensity of Service 0-30 min

## 2024-12-04 NOTE — ANESTHESIA POSTPROCEDURE EVALUATION
"Patient: Katherine Trent \"Kimberly\"    Procedure Summary       Date: 12/04/24 Room / Location: [facility] OR    Anesthesia Start: 1132 Anesthesia Stop: 1217    Procedures:       COLONOSCOPY      EGD Diagnosis:       Gastrointestinal hemorrhage, unspecified gastrointestinal hemorrhage type      Gastrointestinal hemorrhage with melena    Scheduled Providers: Kris King MD; Johnnie Zelaya MD Responsible Provider: Johnnie Zelaya MD    Anesthesia Type: MAC ASA Status: 3            Anesthesia Type: MAC    Vitals Value Taken Time   BP See vitals 12/04/24 1230   Temp  12/04/24 1230   Pulse  12/04/24 1230   Resp  12/04/24 1230   SpO2  12/04/24 1230       Anesthesia Post Evaluation    Patient location during evaluation: PACU  Patient participation: complete - patient participated  Level of consciousness: awake  Pain management: adequate  Multimodal analgesia pain management approach  Airway patency: patent  Two or more strategies used to mitigate risk of obstructive sleep apnea  Cardiovascular status: acceptable  Respiratory status: acceptable  Hydration status: acceptable  Postoperative Nausea and Vomiting: none        No notable events documented.    "

## 2024-12-04 NOTE — CARE PLAN
The patient's goals for the shift include safe and comfortable    The clinical goals for the shift include Patient will remain pain free throough entire shift    Over the shift, the patient did not make progress toward the following goals. Barriers to progression include. Recommendations to address these barriers include.

## 2024-12-04 NOTE — ANESTHESIA PREPROCEDURE EVALUATION
"Patient: Katherine Trent \"Kimberly\"    Procedure Information       Date/Time: 24 1145    Scheduled providers: Kris King MD; Johnnie Zelaya MD    Procedures:       COLONOSCOPY      EGD    Location: [facility] OR          Vitals:    24 1103   BP: 139/61   Pulse: 67   Resp: 16   Temp: 36.6 °C (97.9 °F)   SpO2: 98%       Past Surgical History:   Procedure Laterality Date   • BREAST LUMPECTOMY     • OTHER SURGICAL HISTORY  2020    Knee arthroscopy   • OTHER SURGICAL HISTORY  2020     section   • OTHER SURGICAL HISTORY  2020    Abdominal surgery   • OTHER SURGICAL HISTORY  2020    Facial surgery   • OTHER SURGICAL HISTORY  2020    Lumpectomy   • OTHER SURGICAL HISTORY  2020    Breast augmentation   • OTHER SURGICAL HISTORY  2020    Mass excision   • OTHER SURGICAL HISTORY  2020    Hysterectomy vaginal   • THYROID SURGERY  2014    Thyroid Surgery     Past Medical History:   Diagnosis Date   • Abnormal findings on diagnostic imaging of heart and coronary circulation 2014    Abnormal echocardiogram   • Breast cancer (Multi)    • Delayed emergence from general anesthesia    • Effusion, right knee 10/29/2019    Effusion of right knee   • Fracture of coccyx, initial encounter for closed fracture (Multi) 2020    Closed fracture of coccyx, initial encounter   • Other hypertrophic disorders of the skin 2020    Skin tag   • Personal history of irradiation    • Personal history of other diseases of the circulatory system 2020    History of hypertension   • Personal history of other diseases of the circulatory system 2015    History of coronary artery disease       Current Facility-Administered Medications:   •  acetaminophen (Tylenol) tablet 650 mg, 650 mg, oral, q4h PRN, 650 mg at 24 1651 **OR** acetaminophen (Tylenol) oral liquid 650 mg, 650 mg, nasogastric tube, q4h PRN **OR** acetaminophen " (Tylenol) suppository 650 mg, 650 mg, rectal, q4h PRN, Katie Sandoval DO  •  albuterol 90 mcg/actuation inhaler 1-2 puff, 1-2 puff, inhalation, q6h PRN, Josephine Lopez DO  •  [Held by provider] atenolol (Tenormin) tablet 50 mg, 50 mg, oral, BID, Ritdougik Nellutla, DO, 50 mg at 12/03/24 0840  •  escitalopram (Lexapro) tablet 20 mg, 20 mg, oral, Daily, Rithvik Nellutla, DO, 20 mg at 12/04/24 0804  •  pantoprazole (ProtoNix) EC tablet 40 mg, 40 mg, oral, BID AC, 40 mg at 12/03/24 1510 **OR** esomeprazole (NexIUM) suspension 40 mg, 40 mg, nasoduodenal tube, BID AC **OR** pantoprazole (ProtoNix) injection 40 mg, 40 mg, intravenous, BID AC, Ritakosua Bartonutla, DO, 40 mg at 12/04/24 0627  •  levothyroxine (Synthroid, Levoxyl) tablet 75 mcg, 75 mcg, oral, Daily, Ritdougik Nellutla, DO, 75 mcg at 12/04/24 0804  •  sennosides-docusate sodium (Dasha-Colace) 8.6-50 mg per tablet 2 tablet, 2 tablet, oral, Nightly PRN, Josephine Lopez, DO  •  venlafaxine (Effexor) tablet 37.5 mg, 37.5 mg, oral, Daily, Ritdougik Oraliautla, DO, 37.5 mg at 12/04/24 0804  •  zolpidem (Ambien) tablet 10 mg, 10 mg, oral, Nightly, Rithvik Nellutla, DO, 10 mg at 12/03/24 2203  Prior to Admission medications    Medication Sig Start Date End Date Taking? Authorizing Provider   acetaminophen (Tylenol) 325 mg tablet Take 4 tablets (1,300 mg) by mouth once daily as needed for mild pain (1 - 3).   Yes Historical Provider, MD   amphetamine-dextroamphetamine (Adderall) 30 mg tablet Take 1 tablet (30 mg) by mouth 2 times a day. 11/11/24  Yes Presley Eugene MD   atenolol (Tenormin) 50 mg tablet Take 1 tablet (50 mg) by mouth 2 times a day. 7/30/24  Yes Presley Eugene MD   escitalopram (Lexapro) 20 mg tablet Take 1 tablet (20 mg) by mouth once daily. 10/2/24  Yes Presley Eugene MD   estrogens-methyltestosterone (Estratest HS) 0.625-1.25 mg tablet Take 1 tablet by mouth once daily. 11/15/24  Yes Presley Eugene MD   levothyroxine (Synthroid, Levoxyl) 75 mcg tablet  Take 1 tablet (75 mcg) by mouth once daily. 11/15/24  Yes Presley Eugene MD   multivitamin (Daily Multi-Vitamin) tablet Take by mouth.   Yes Historical Provider, MD   venlafaxine (Effexor) 37.5 mg tablet Take 1 tablet (37.5 mg) by mouth once daily. 10/2/24  Yes Presley Eugene MD   zolpidem CR (Ambien CR) 12.5 mg ER tablet Take 1 tablet (12.5 mg) by mouth once daily at bedtime. 10/2/24  Yes Presley Eugene MD   albuterol 90 mcg/actuation inhaler Inhale 1-2 puffs every 6 hours if needed for wheezing. 3/22/24 4/21/24  Mulu Stone MD   azithromycin (Zithromax) 250 mg tablet Take 1 tablet (250 mg) by mouth once daily. Take as directed. Take 2 tablets by mouth the first day and then once daily until complete .  Patient not taking: Reported on 12/2/2024 3/11/24 12/2/24  Presley Eugene MD   hyaluronan (Orthovisc) 30 mg/2 mL injection Inject into the joint. 12/9/19 12/2/24  Historical Provider, MD   meloxicam (Mobic) 15 mg tablet Take by mouth once daily. 10/29/19 12/2/24  Historical Provider, MD     No Known Allergies  Social History     Tobacco Use   • Smoking status: Never   • Smokeless tobacco: Never   Substance Use Topics   • Alcohol use: Yes     Alcohol/week: 14.0 standard drinks of alcohol     Types: 14 Shots of liquor per week     Comment: 1-2 drinks/day liquor         Chemistry    Lab Results   Component Value Date/Time     12/04/2024 0659    K 4.0 12/04/2024 0659     12/04/2024 0659    CO2 29 12/04/2024 0659    BUN 16 12/04/2024 0659    CREATININE 1.02 12/04/2024 0659    Lab Results   Component Value Date/Time    CALCIUM 8.6 12/04/2024 0659    ALKPHOS 37 12/04/2024 0659    AST 23 12/04/2024 0659    ALT 18 12/04/2024 0659    BILITOT 0.3 12/04/2024 0659          Lab Results   Component Value Date/Time    WBC 6.5 12/04/2024 0659    HGB 7.0 (L) 12/04/2024 0659    HCT 23.0 (L) 12/04/2024 0659     12/04/2024 0659     Lab Results   Component Value Date/Time    PROTIME 11.1 12/03/2024 0617     INR 1.0 12/03/2024 0617     Encounter Date: 12/02/24   ECG 12 lead   Result Value    Ventricular Rate 66    Atrial Rate 66    IN Interval 106    QRS Duration 146    QT Interval 440    QTC Calculation(Bazett) 461    P Axis 58    R Axis 189    T Axis 63    QRS Count 11    Q Onset 228    P Onset 175    P Offset 214    T Offset 448    QTC Fredericia 454    Narrative    Sinus rhythm with short IN  Right bundle branch block  Septal infarct (cited on or before 22-MAR-2024)  Abnormal ECG  When compared with ECG of 22-MAR-2024 11:22,  Fusion complexes are no longer Present  IN interval has decreased  Questionable change in initial forces of Septal leads     No results found for this or any previous visit from the past 1095 days.      Relevant Problems   Cardiac   (+) Arteriosclerosis of coronary artery      Neuro   (+) Acute depression   (+) Lumbar radiculopathy, chronic      GI   (+) GI bleed      Endocrine   (+) Hypothyroidism       Clinical information reviewed:   Tobacco  Allergies  Meds  Problems  Med Hx  Surg Hx   Fam Hx  Soc   Hx        NPO Detail:  NPO/Void Status  Date of Last Liquid: 12/04/24  Time of Last Liquid: 0000  Date of Last Solid: 12/02/24  Last Intake Type: GI prep         Physical Exam    Airway  Mallampati: II     Cardiovascular - normal exam     Dental    Pulmonary    Abdominal          Anesthesia Plan    History of general anesthesia?: yes  History of complications of general anesthesia?: no    ASA 3     MAC     The patient is not a current smoker.  Patient was not previously instructed to abstain from smoking on day of procedure.  Patient did not smoke on day of procedure.  Education provided regarding risk of obstructive sleep apnea.  intravenous induction   Anesthetic plan and risks discussed with patient.    Plan discussed with CRNA.

## 2024-12-05 ENCOUNTER — PHARMACY VISIT (OUTPATIENT)
Dept: PHARMACY | Facility: CLINIC | Age: 69
End: 2024-12-05
Payer: COMMERCIAL

## 2024-12-05 VITALS
OXYGEN SATURATION: 98 % | HEART RATE: 85 BPM | SYSTOLIC BLOOD PRESSURE: 129 MMHG | RESPIRATION RATE: 16 BRPM | WEIGHT: 145.8 LBS | DIASTOLIC BLOOD PRESSURE: 74 MMHG | HEIGHT: 66 IN | TEMPERATURE: 98.2 F | BODY MASS INDEX: 23.43 KG/M2

## 2024-12-05 PROBLEM — K92.2 GI BLEED: Status: RESOLVED | Noted: 2024-12-02 | Resolved: 2024-12-05

## 2024-12-05 PROBLEM — K92.2 GASTROINTESTINAL HEMORRHAGE, UNSPECIFIED GASTROINTESTINAL HEMORRHAGE TYPE: Status: RESOLVED | Noted: 2024-12-04 | Resolved: 2024-12-05

## 2024-12-05 LAB
ALBUMIN SERPL BCP-MCNC: 3.6 G/DL (ref 3.4–5)
ALP SERPL-CCNC: 43 U/L (ref 33–136)
ALT SERPL W P-5'-P-CCNC: 19 U/L (ref 7–45)
ANION GAP SERPL CALC-SCNC: 13 MMOL/L (ref 10–20)
AST SERPL W P-5'-P-CCNC: 23 U/L (ref 9–39)
BILIRUB SERPL-MCNC: 0.9 MG/DL (ref 0–1.2)
BLOOD EXPIRATION DATE: NORMAL
BUN SERPL-MCNC: 11 MG/DL (ref 6–23)
CALCIUM SERPL-MCNC: 8.5 MG/DL (ref 8.6–10.3)
CHLORIDE SERPL-SCNC: 102 MMOL/L (ref 98–107)
CO2 SERPL-SCNC: 27 MMOL/L (ref 21–32)
CREAT SERPL-MCNC: 0.86 MG/DL (ref 0.5–1.05)
DISPENSE STATUS: NORMAL
EGFRCR SERPLBLD CKD-EPI 2021: 73 ML/MIN/1.73M*2
ERYTHROCYTE [DISTWIDTH] IN BLOOD BY AUTOMATED COUNT: 15.8 % (ref 11.5–14.5)
GLUCOSE BLD MANUAL STRIP-MCNC: 82 MG/DL (ref 74–99)
GLUCOSE SERPL-MCNC: 91 MG/DL (ref 74–99)
HCT VFR BLD AUTO: 26.3 % (ref 36–46)
HGB BLD-MCNC: 8.3 G/DL (ref 12–16)
MAGNESIUM SERPL-MCNC: 1.83 MG/DL (ref 1.6–2.4)
MCH RBC QN AUTO: 27.2 PG (ref 26–34)
MCHC RBC AUTO-ENTMCNC: 31.6 G/DL (ref 32–36)
MCV RBC AUTO: 86 FL (ref 80–100)
NRBC BLD-RTO: 0 /100 WBCS (ref 0–0)
PHOSPHATE SERPL-MCNC: 3.6 MG/DL (ref 2.5–4.9)
PLATELET # BLD AUTO: 228 X10*3/UL (ref 150–450)
POTASSIUM SERPL-SCNC: 3.5 MMOL/L (ref 3.5–5.3)
PRODUCT BLOOD TYPE: 5100
PRODUCT CODE: NORMAL
PROT SERPL-MCNC: 5.6 G/DL (ref 6.4–8.2)
RBC # BLD AUTO: 3.05 X10*6/UL (ref 4–5.2)
SODIUM SERPL-SCNC: 138 MMOL/L (ref 136–145)
UNIT ABO: NORMAL
UNIT NUMBER: NORMAL
UNIT RH: NORMAL
UNIT VOLUME: 350
WBC # BLD AUTO: 6.1 X10*3/UL (ref 4.4–11.3)
XM INTEP: NORMAL

## 2024-12-05 PROCEDURE — 83735 ASSAY OF MAGNESIUM: CPT

## 2024-12-05 PROCEDURE — 82947 ASSAY GLUCOSE BLOOD QUANT: CPT

## 2024-12-05 PROCEDURE — RXMED WILLOW AMBULATORY MEDICATION CHARGE

## 2024-12-05 PROCEDURE — 2500000002 HC RX 250 W HCPCS SELF ADMINISTERED DRUGS (ALT 637 FOR MEDICARE OP, ALT 636 FOR OP/ED)

## 2024-12-05 PROCEDURE — 80053 COMPREHEN METABOLIC PANEL: CPT

## 2024-12-05 PROCEDURE — 84100 ASSAY OF PHOSPHORUS: CPT

## 2024-12-05 PROCEDURE — 99238 HOSP IP/OBS DSCHRG MGMT 30/<: CPT

## 2024-12-05 PROCEDURE — 2500000001 HC RX 250 WO HCPCS SELF ADMINISTERED DRUGS (ALT 637 FOR MEDICARE OP)

## 2024-12-05 PROCEDURE — 36415 COLL VENOUS BLD VENIPUNCTURE: CPT

## 2024-12-05 PROCEDURE — 85027 COMPLETE CBC AUTOMATED: CPT

## 2024-12-05 RX ORDER — PANTOPRAZOLE SODIUM 40 MG/1
40 TABLET, DELAYED RELEASE ORAL
Qty: 120 TABLET | Refills: 0 | Status: SHIPPED | OUTPATIENT
Start: 2024-12-05 | End: 2025-02-03

## 2024-12-05 ASSESSMENT — PAIN SCALES - GENERAL: PAINLEVEL_OUTOF10: 2

## 2024-12-05 NOTE — DISCHARGE INSTRUCTIONS
Please, take your home medications as instructed after being discharged from the hospital.     NEW MEDICATIONS:  - Avoid NSAID medications  - Start taking     UPCOMING APPOINTMENTS:     Please, follow-up with your PCP, GI & Sleep Medicine within 7 to 14 days after leaving the hospital. /appointment services has been requested to make an appointment for you, however if you do not hear back from them within 1 to 2 days, please call your primary physician's office to schedule an appointment. Bring your photo ID and insurance card to your appointment.   [facility]  services can be reached at [phone].     If you experience any worsening symptoms or have any concerns, please contact your primary care provider to schedule an appointment. If you cannot get in touch with your primary care physician, please return to the nearest emergency room or urgent care clinic for an evaluation and treatment.     Thank you for choosing [facility] and allowing us to partake in your medical care!     - Your [facility] inpatient primary care team.

## 2024-12-05 NOTE — DISCHARGE SUMMARY
Discharge Diagnosis  GI bleed 2/2 Duodenal Ulcer  Anemia; Weakness  GERD    Issues Requiring Follow-Up  Insomnia  Duodenal Ulcer  Anemia  Depression/anxiety   Hypothyroid    Discharge Meds     Medication List      START taking these medications     pantoprazole 40 mg EC tablet; Commonly known as: ProtoNix; Take 1 tablet   (40 mg) by mouth 2 times a day before meals. Do not crush, chew, or split.     CONTINUE taking these medications     acetaminophen 325 mg tablet; Commonly known as: Tylenol   albuterol 90 mcg/actuation inhaler; Inhale 1-2 puffs every 6 hours if   needed for wheezing.   amphetamine-dextroamphetamine 30 mg tablet; Commonly known as: Adderall;   Take 1 tablet (30 mg) by mouth 2 times a day.   atenolol 50 mg tablet; Commonly known as: Tenormin; Take 1 tablet (50   mg) by mouth 2 times a day.   Daily Multi-Vitamin tablet; Generic drug: multivitamin   escitalopram 20 mg tablet; Commonly known as: Lexapro; Take 1 tablet (20   mg) by mouth once daily.   estrogens-methyltestosterone 0.625-1.25 mg tablet; Commonly known as:   Estratest HS; Take 1 tablet by mouth once daily.   levothyroxine 75 mcg tablet; Commonly known as: Synthroid, Levoxyl; Take   1 tablet (75 mcg) by mouth once daily.   venlafaxine 37.5 mg tablet; Commonly known as: Effexor; Take 1 tablet   (37.5 mg) by mouth once daily.   zolpidem CR 12.5 mg ER tablet; Commonly known as: Ambien CR; Take 1   tablet (12.5 mg) by mouth once daily at bedtime.     ASK your doctor about these medications     sulfamethoxazole-trimethoprim 800-160 mg tablet; Commonly known as:   Bactrim DS; Take 1 tablet by mouth 2 times a day for 7 days.; Ask about:   Should I take this medication?       Test Results Pending At Discharge  Pending Labs       Order Current Status    Surgical Pathology Exam In process            Hospital Course  Kimberly Trent is a 69 y.o. female with PMH of HTN, ADD, depression/anxiety, insomnia, hypothyroidism, postmenopausal hypogonadism ,  presenting with weakness abdominal pain and rectal bleeding.     Patient presents complaining of severe abdominal pain which worsened last night (12/1/2024).  She endorses a 3-month history of abdominal pain which she attributes to her GERD.  She explains that her pain is usually relieved with lying on her left side, and is generally responsive to omeprazole. She states the pain has gotten so severe at times that it has brought her to tears.   Last night she reports her pain had significantly worsened to that point-she describes the character of the pain as generalized all over her belly.  At 8:00 PM 12/2 she reports using the bathroom to try and relieve her pain-she noted bright red blood per rectum that filled the toilet.  She also reports that she had been feeling progressively fatigued during the last 3 months as well yet this was the first bowel movement that she noted with bright red blood. She states she experienced some relief after her bloody bowel movement.     Patient also endorses a history of palpitations, and states she was started on atenolol many years ago by her cardiologist that she had stopped seeing 6 years ago.  She reports never following up since.     Patient currently also endorses a headache,-she reports a history of chronic headaches and states that she takes Excedrin almost daily to treat them.     She reports hypothyroidism following a thyroidectomy 10+ years ago for a malignancy.  Currently endorses a tender spot/nodule on her anterior left neck.     Admits cold intolerance, headache, abdominal pain, weakness, malaise, palpitations, paresthesias (bilateral hands-attributes to Raynaud's)  Denies constipation, diarrhea, chest pain, shortness of breath, visual changes        ED course:  Lab work obtained, notable for 8.1 Hgb (down from 11  4 months ago).  CTAP was obtained largely unremarkable aside from some diverticuli, negative for diverticulitis.  No signs of active bleed.  Rectal exam  in ED normal, no external hemorrhoids, no palpable internal hemorrhoids, no frankly bloody stool.      Hospital Course:  Patient was admitted, GI was consulted,  Hemoglobin was trended, type and screen, consent obtained.  Patient started on twice daily PPI. Patient kept n.p.o., GI planning upper and lower scope for 12/4/2024. Scope shows small type I hiatal hernia, Mild erythematous mucosa in the antrum; performed cold forceps biopsy to rule out H. Pylori, Single ulcer in the duodenal bulb, Single ulcer in the 1st part of the duodenum, Narrowing at D1 due to underlying edema, inflammation.  Unable to evaluate D2.  No active bleeding noted in the examined area, Ring in the GE junction. Due to low hgb patient was given 1u of blood. Patient noted significant improvement in energy after transfusion.    HDS for discharge.    Pertinent Physical Exam At Time of Discharge  Physical Exam  Constitutional:       General: She is not in acute distress.  Cardiovascular:      Rate and Rhythm: Normal rate.      Heart sounds:      No friction rub. No gallop.   Pulmonary:      Effort: Pulmonary effort is normal. No respiratory distress.   Musculoskeletal:      Right lower leg: No edema.      Left lower leg: No edema.   Skin:     General: Skin is dry.   Neurological:      Mental Status: She is alert and oriented to person, place, and time. Mental status is at baseline.   Psychiatric:         Mood and Affect: Mood normal.         Behavior: Behavior normal.         Outpatient Follow-Up  No future appointments.      Josephine Lopez DO

## 2024-12-05 NOTE — PROGRESS NOTES
12/05/24 1200   Discharge Planning   Expected Discharge Disposition Home  (dc today-IMM obtained. Son bedside to transport home when dc completed. Meds to beds prior to discharge. Patient denies home going needs)

## 2024-12-05 NOTE — DOCUMENTATION CLARIFICATION NOTE
"    PATIENT:               CIRO PEÑA  ACCT #:                  [id number]  MRN:                       [id number]  :                       1955  ADMIT DATE:       2024 11:43 AM  DISCH DATE:        2024 2:02 PM  RESPONDING PROVIDER #:        [id number]          PROVIDER RESPONSE TEXT:    GI bleed/duodenal ulcer due to or enhanced by Excedrin use    CDI QUERY TEXT:    Clarification    Instruction:    Based on your assessment of the patient and the clinical information, please provide the requested documentation by clicking on the appropriate radio button and enter any additional information if prompted.    Question: Please further clarify if a relationship exists between GI bleed/duodenal ulcer and Excedrin    When answering this query, please exercise your independent professional judgment. The fact that a question is being asked, does not imply that any particular answer is desired or expected.    The patient's clinical indicators include:  70 y/o female presented with BRBPR, abdominal cramping. Does take Excedrin daily for chronic headaches.    Clinical Indicators: Hgb  8.1, 7.5, 12/3 7.4, 7.0, 7.4  7.0, 6.9,  8.3  H&P and Progress notes: \"GI bleed with anemia and abdominal pain\"    EGD Impression: \"Small type I hiatal hernia  Mild erythematous mucosa in the antrum; performed cold forceps biopsy to rule out H. pylori  Single ulcer in the duodenal bulb  Single ulcer in the 1st part of the duodenum\"    Treatment: Transfused 1 unit PRBCs, Protonix 40 mg IV or po BID before meals ( - )    Risk Factors: daily Excedrin use  Options provided:  -- GI bleed/duodenal ulcer due to or enhanced by Excedrin use  -- GI bleed/duodenal ulcer not related to Excedrin use  -- Other - I will add my own diagnosis  -- Refer to Clinical Documentation Reviewer    Query created by: Dania Holly on 2024 12:36 PM      Electronically signed by:  KENZIE MCCULLOUGH DO 2024 2:15 PM          "

## 2024-12-05 NOTE — DOCUMENTATION CLARIFICATION NOTE
"    PATIENT:               CIRO PEÑA  ACCT #:                  [id number]  MRN:                       [id number]  :                       1955  ADMIT DATE:       2024 11:43 AM  DISCH DATE:  RESPONDING PROVIDER #:        [id number]          PROVIDER RESPONSE TEXT:    Acute on Chronic blood loss anemia    CDI QUERY TEXT:    Clarification    Instruction:    Based on your assessment of the patient and the clinical information, please provide the requested documentation by clicking on the appropriate radio button and enter any additional information if prompted.    Question: Please further clarify the diagnosis of anemia as    When answering this query, please exercise your independent professional judgment. The fact that a question is being asked, does not imply that any particular answer is desired or expected.    The patient's clinical indicators include:  Clinical Information: 68 y/o female presented with BRBPR, abdominal cramping. Does take Excedrin daily for chronic headaches.    Clinical Indicators: Hgb  8.1, 7.5, 12/3 7.4, 7.0, 7.4  7.0, 6.9,  8.3    Per notes: \"GI bleed with anemia and abdominal pain\"    EGD Impression: \"Small type I hiatal hernia  Mild erythematous mucosa in the antrum; performed cold forceps biopsy to rule out H. pylori  Single ulcer in the duodenal bulb  Single ulcer in the 1st part of the duodenum\"    Colonoscopy Impression: \"One polyp measuring smaller than 5 mm in the rectosigmoid; performed cold snare with complete en bloc removal and retrieved specimen; placed 1 clip successfully (clip is MRI compatible); hemostasis achieved  Few small diverticula in the sigmoid colon  Internal (grade 1) hemorrhoids observed during retroflexion; no bleeding was observed.\"    Treatment: Transfused 1 unit PRBCs, Protonix 40 mg IV or po BID before meals ( - )    Risk Factors: daily Excedrin use  Options provided:  -- Acute blood loss anemia  -- Acute on Chronic blood loss " anemia  -- Other - I will add my own diagnosis  -- Refer to Clinical Documentation Reviewer    Query created by: Dania Holly on 12/5/2024 7:00 AM      Electronically signed by:  KENZIE MCCULLOUGH DO 12/5/2024 8:03 AM

## 2024-12-07 NOTE — SIGNIFICANT EVENT
Follow Up Phone Call    Outgoing phone call    Spoke to: Katherine Trent Relationship:self   Phone number: [phone]      Outcome: I left a message on answering machine   Chief Complaint   Patient presents with   • Abdominal Pain   • Rectal Bleeding          Diagnosis:Not applicable

## 2024-12-10 ENCOUNTER — OFFICE VISIT (OUTPATIENT)
Dept: PRIMARY CARE | Facility: CLINIC | Age: 69
End: 2024-12-10
Payer: MEDICARE

## 2024-12-10 VITALS
WEIGHT: 149 LBS | DIASTOLIC BLOOD PRESSURE: 78 MMHG | BODY MASS INDEX: 23.95 KG/M2 | HEIGHT: 66 IN | TEMPERATURE: 96 F | SYSTOLIC BLOOD PRESSURE: 122 MMHG | OXYGEN SATURATION: 95 % | RESPIRATION RATE: 16 BRPM | HEART RATE: 74 BPM

## 2024-12-10 DIAGNOSIS — E03.9 ACQUIRED HYPOTHYROIDISM: ICD-10-CM

## 2024-12-10 DIAGNOSIS — K26.4 GASTROINTESTINAL HEMORRHAGE ASSOCIATED WITH DUODENAL ULCER: ICD-10-CM

## 2024-12-10 DIAGNOSIS — F51.01 PRIMARY INSOMNIA: ICD-10-CM

## 2024-12-10 DIAGNOSIS — F98.8 ATTENTION DEFICIT DISORDER (ADD) WITHOUT HYPERACTIVITY: ICD-10-CM

## 2024-12-10 DIAGNOSIS — K62.5 GASTROINTESTINAL HEMORRHAGE ASSOCIATED WITH ANORECTAL SOURCE: Primary | ICD-10-CM

## 2024-12-10 DIAGNOSIS — I10 HYPERTENSION, UNSPECIFIED TYPE: ICD-10-CM

## 2024-12-10 DIAGNOSIS — R45.86 MOOD CHANGE: ICD-10-CM

## 2024-12-10 PROCEDURE — 1111F DSCHRG MED/CURRENT MED MERGE: CPT | Performed by: INTERNAL MEDICINE

## 2024-12-10 PROCEDURE — 1159F MED LIST DOCD IN RCRD: CPT | Performed by: INTERNAL MEDICINE

## 2024-12-10 PROCEDURE — 3008F BODY MASS INDEX DOCD: CPT | Performed by: INTERNAL MEDICINE

## 2024-12-10 PROCEDURE — 99214 OFFICE O/P EST MOD 30 MIN: CPT | Performed by: INTERNAL MEDICINE

## 2024-12-10 PROCEDURE — 3074F SYST BP LT 130 MM HG: CPT | Performed by: INTERNAL MEDICINE

## 2024-12-10 PROCEDURE — 1123F ACP DISCUSS/DSCN MKR DOCD: CPT | Performed by: INTERNAL MEDICINE

## 2024-12-10 PROCEDURE — 3078F DIAST BP <80 MM HG: CPT | Performed by: INTERNAL MEDICINE

## 2024-12-10 RX ORDER — PANTOPRAZOLE SODIUM 40 MG/1
40 TABLET, DELAYED RELEASE ORAL
Qty: 120 TABLET | Refills: 0 | Status: SHIPPED | OUTPATIENT
Start: 2024-12-10 | End: 2025-02-08

## 2024-12-10 RX ORDER — VENLAFAXINE 37.5 MG/1
37.5 TABLET ORAL DAILY
Qty: 90 TABLET | Refills: 1 | Status: SHIPPED | OUTPATIENT
Start: 2024-12-10

## 2024-12-10 RX ORDER — DEXTROAMPHETAMINE SACCHARATE, AMPHETAMINE ASPARTATE, DEXTROAMPHETAMINE SULFATE AND AMPHETAMINE SULFATE 7.5; 7.5; 7.5; 7.5 MG/1; MG/1; MG/1; MG/1
1 TABLET ORAL 2 TIMES DAILY
Qty: 60 TABLET | Refills: 0 | Status: SHIPPED | OUTPATIENT
Start: 2024-12-10

## 2024-12-10 RX ORDER — ATENOLOL 50 MG/1
50 TABLET ORAL 2 TIMES DAILY
Qty: 180 TABLET | Refills: 3 | Status: SHIPPED | OUTPATIENT
Start: 2024-12-10

## 2024-12-10 RX ORDER — ZOLPIDEM TARTRATE 6.25 MG/1
6.25 TABLET, FILM COATED, EXTENDED RELEASE ORAL NIGHTLY
Qty: 90 TABLET | Refills: 0 | Status: SHIPPED | OUTPATIENT
Start: 2024-12-10

## 2024-12-10 RX ORDER — ESCITALOPRAM OXALATE 20 MG/1
20 TABLET ORAL DAILY
Qty: 90 TABLET | Refills: 3 | Status: SHIPPED | OUTPATIENT
Start: 2024-12-10

## 2024-12-11 LAB
ATRIAL RATE: 67 BPM
P AXIS: 69 DEGREES
P OFFSET: 214 MS
P ONSET: 158 MS
PR INTERVAL: 142 MS
Q ONSET: 229 MS
QRS COUNT: 11 BEATS
QRS DURATION: 148 MS
QT INTERVAL: 436 MS
QTC CALCULATION(BAZETT): 460 MS
QTC FREDERICIA: 452 MS
R AXIS: 188 DEGREES
T AXIS: 64 DEGREES
T OFFSET: 447 MS
VENTRICULAR RATE: 67 BPM

## 2024-12-12 ENCOUNTER — PATIENT OUTREACH (OUTPATIENT)
Dept: CARE COORDINATION | Facility: CLINIC | Age: 69
End: 2024-12-12
Payer: MEDICARE

## 2024-12-12 LAB
ATRIAL RATE: 75 BPM
P AXIS: 64 DEGREES
P OFFSET: 214 MS
P ONSET: 151 MS
PR INTERVAL: 156 MS
Q ONSET: 229 MS
QRS COUNT: 13 BEATS
QRS DURATION: 150 MS
QT INTERVAL: 414 MS
QTC CALCULATION(BAZETT): 462 MS
QTC FREDERICIA: 445 MS
R AXIS: 200 DEGREES
T AXIS: 59 DEGREES
T OFFSET: 436 MS
VENTRICULAR RATE: 75 BPM

## 2024-12-13 ENCOUNTER — APPOINTMENT (OUTPATIENT)
Dept: PRIMARY CARE | Facility: CLINIC | Age: 69
End: 2024-12-13
Payer: MEDICARE

## 2024-12-13 LAB
LABORATORY COMMENT REPORT: NORMAL
PATH REPORT.FINAL DX SPEC: NORMAL
PATH REPORT.GROSS SPEC: NORMAL
PATH REPORT.TOTAL CANCER: NORMAL

## 2025-01-13 DIAGNOSIS — I10 HYPERTENSION, UNSPECIFIED TYPE: ICD-10-CM

## 2025-01-13 DIAGNOSIS — F98.8 ATTENTION DEFICIT DISORDER (ADD) WITHOUT HYPERACTIVITY: ICD-10-CM

## 2025-01-13 DIAGNOSIS — K26.4 GASTROINTESTINAL HEMORRHAGE ASSOCIATED WITH DUODENAL ULCER: ICD-10-CM

## 2025-01-13 RX ORDER — DEXTROAMPHETAMINE SACCHARATE, AMPHETAMINE ASPARTATE, DEXTROAMPHETAMINE SULFATE AND AMPHETAMINE SULFATE 7.5; 7.5; 7.5; 7.5 MG/1; MG/1; MG/1; MG/1
1 TABLET ORAL 2 TIMES DAILY
Qty: 60 TABLET | Refills: 0 | Status: SHIPPED | OUTPATIENT
Start: 2025-01-13

## 2025-01-13 RX ORDER — ATENOLOL 50 MG/1
50 TABLET ORAL 2 TIMES DAILY
Qty: 180 TABLET | Refills: 3 | Status: SHIPPED | OUTPATIENT
Start: 2025-01-13

## 2025-01-13 RX ORDER — PANTOPRAZOLE SODIUM 40 MG/1
40 TABLET, DELAYED RELEASE ORAL
Qty: 180 TABLET | Refills: 1 | Status: SHIPPED | OUTPATIENT
Start: 2025-01-13 | End: 2025-07-12

## 2025-01-14 ENCOUNTER — PATIENT OUTREACH (OUTPATIENT)
Dept: CARE COORDINATION | Facility: CLINIC | Age: 70
End: 2025-01-14
Payer: MEDICARE

## 2025-01-14 NOTE — PROGRESS NOTES
Outreach call to patient to follow up after 30 days of hospital discharge. Unable to connect with patient. Will dis enroll patient from transitions of care.     BEATRIZ Su, RN   [phone]   ACO Department

## 2025-02-10 DIAGNOSIS — F98.8 ATTENTION DEFICIT DISORDER (ADD) WITHOUT HYPERACTIVITY: ICD-10-CM

## 2025-02-10 RX ORDER — DEXTROAMPHETAMINE SACCHARATE, AMPHETAMINE ASPARTATE, DEXTROAMPHETAMINE SULFATE AND AMPHETAMINE SULFATE 7.5; 7.5; 7.5; 7.5 MG/1; MG/1; MG/1; MG/1
1 TABLET ORAL 2 TIMES DAILY
Qty: 60 TABLET | Refills: 0 | Status: SHIPPED | OUTPATIENT
Start: 2025-02-10

## 2025-03-05 ENCOUNTER — PATIENT OUTREACH (OUTPATIENT)
Dept: CARE COORDINATION | Facility: CLINIC | Age: 70
End: 2025-03-05
Payer: MEDICARE

## 2025-03-14 DIAGNOSIS — F51.01 PRIMARY INSOMNIA: ICD-10-CM

## 2025-03-14 RX ORDER — ZOLPIDEM TARTRATE 12.5 MG/1
12.5 TABLET, FILM COATED, EXTENDED RELEASE ORAL NIGHTLY
Qty: 90 TABLET | Refills: 0 | Status: SHIPPED | OUTPATIENT
Start: 2025-03-14

## 2025-04-02 DIAGNOSIS — F98.8 ATTENTION DEFICIT DISORDER (ADD) WITHOUT HYPERACTIVITY: ICD-10-CM

## 2025-04-02 RX ORDER — DEXTROAMPHETAMINE SACCHARATE, AMPHETAMINE ASPARTATE, DEXTROAMPHETAMINE SULFATE AND AMPHETAMINE SULFATE 7.5; 7.5; 7.5; 7.5 MG/1; MG/1; MG/1; MG/1
1 TABLET ORAL 2 TIMES DAILY
Qty: 60 TABLET | Refills: 0 | Status: SHIPPED | OUTPATIENT
Start: 2025-04-02

## 2025-04-30 DIAGNOSIS — F98.8 ATTENTION DEFICIT DISORDER (ADD) WITHOUT HYPERACTIVITY: ICD-10-CM

## 2025-05-01 RX ORDER — DEXTROAMPHETAMINE SACCHARATE, AMPHETAMINE ASPARTATE, DEXTROAMPHETAMINE SULFATE AND AMPHETAMINE SULFATE 7.5; 7.5; 7.5; 7.5 MG/1; MG/1; MG/1; MG/1
1 TABLET ORAL 2 TIMES DAILY
Qty: 60 TABLET | Refills: 0 | Status: SHIPPED | OUTPATIENT
Start: 2025-05-01

## 2025-05-02 ENCOUNTER — TELEPHONE (OUTPATIENT)
Dept: PRIMARY CARE | Facility: CLINIC | Age: 70
End: 2025-05-02

## 2025-05-02 DIAGNOSIS — R05.1 ACUTE COUGH: Primary | ICD-10-CM

## 2025-05-02 RX ORDER — AZITHROMYCIN 250 MG/1
TABLET, FILM COATED ORAL
Qty: 6 TABLET | Refills: 0 | Status: SHIPPED | OUTPATIENT
Start: 2025-05-02 | End: 2025-05-07

## 2025-05-02 NOTE — TELEPHONE ENCOUNTER
Pt called and said that she has a sinus infection and wants to know if you can call in an antibiotics for her.

## 2025-06-02 DIAGNOSIS — F98.8 ATTENTION DEFICIT DISORDER (ADD) WITHOUT HYPERACTIVITY: ICD-10-CM

## 2025-06-03 RX ORDER — DEXTROAMPHETAMINE SACCHARATE, AMPHETAMINE ASPARTATE, DEXTROAMPHETAMINE SULFATE AND AMPHETAMINE SULFATE 7.5; 7.5; 7.5; 7.5 MG/1; MG/1; MG/1; MG/1
1 TABLET ORAL 2 TIMES DAILY
Qty: 60 TABLET | Refills: 0 | Status: SHIPPED | OUTPATIENT
Start: 2025-06-03

## 2025-06-11 DIAGNOSIS — F51.01 PRIMARY INSOMNIA: ICD-10-CM

## 2025-06-11 RX ORDER — ZOLPIDEM TARTRATE 12.5 MG/1
12.5 TABLET, FILM COATED, EXTENDED RELEASE ORAL NIGHTLY
Qty: 90 TABLET | Refills: 0 | Status: SHIPPED | OUTPATIENT
Start: 2025-06-11 | End: 2025-06-12 | Stop reason: SDUPTHER

## 2025-06-12 DIAGNOSIS — F51.01 PRIMARY INSOMNIA: ICD-10-CM

## 2025-06-12 RX ORDER — ZOLPIDEM TARTRATE 12.5 MG/1
12.5 TABLET, FILM COATED, EXTENDED RELEASE ORAL NIGHTLY
Qty: 90 TABLET | Refills: 0 | Status: SHIPPED | OUTPATIENT
Start: 2025-06-12

## 2025-07-01 DIAGNOSIS — F51.01 PRIMARY INSOMNIA: ICD-10-CM

## 2025-07-01 DIAGNOSIS — R45.86 MOOD CHANGE: ICD-10-CM

## 2025-07-01 DIAGNOSIS — F98.8 ATTENTION DEFICIT DISORDER (ADD) WITHOUT HYPERACTIVITY: ICD-10-CM

## 2025-07-01 RX ORDER — ZOLPIDEM TARTRATE 12.5 MG/1
12.5 TABLET, FILM COATED, EXTENDED RELEASE ORAL NIGHTLY
Qty: 30 TABLET | Refills: 0 | Status: SHIPPED | OUTPATIENT
Start: 2025-07-01

## 2025-07-01 RX ORDER — ESCITALOPRAM OXALATE 20 MG/1
20 TABLET ORAL DAILY
Qty: 30 TABLET | Refills: 0 | Status: SHIPPED | OUTPATIENT
Start: 2025-07-01

## 2025-07-01 RX ORDER — VENLAFAXINE 37.5 MG/1
37.5 TABLET ORAL DAILY
Qty: 30 TABLET | Refills: 0 | Status: SHIPPED | OUTPATIENT
Start: 2025-07-01

## 2025-07-01 RX ORDER — DEXTROAMPHETAMINE SACCHARATE, AMPHETAMINE ASPARTATE, DEXTROAMPHETAMINE SULFATE AND AMPHETAMINE SULFATE 7.5; 7.5; 7.5; 7.5 MG/1; MG/1; MG/1; MG/1
1 TABLET ORAL 2 TIMES DAILY
Qty: 60 TABLET | Refills: 0 | Status: SHIPPED | OUTPATIENT
Start: 2025-07-01

## 2025-07-10 ENCOUNTER — TELEPHONE (OUTPATIENT)
Dept: PRIMARY CARE | Facility: CLINIC | Age: 70
End: 2025-07-10
Payer: MEDICARE

## 2025-07-10 DIAGNOSIS — R21 RASH: Primary | ICD-10-CM

## 2025-07-10 NOTE — TELEPHONE ENCOUNTER
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

## 2025-07-11 RX ORDER — METHYLPREDNISOLONE 4 MG/1
TABLET ORAL
Qty: 21 TABLET | Refills: 0 | Status: SHIPPED | OUTPATIENT
Start: 2025-07-11 | End: 2025-07-17

## 2025-07-11 NOTE — TELEPHONE ENCOUNTER
Called patient no answer, left v/m that per JTP, Please let her know it is usually self limiting, Have her check in with us next week. I have prescribed a medrol dose pack sent to local [facility], AM

## 2025-07-26 DIAGNOSIS — E28.39 HYPOGONADISM, OVARIAN: ICD-10-CM

## 2025-07-28 DIAGNOSIS — K26.4 GASTROINTESTINAL HEMORRHAGE ASSOCIATED WITH DUODENAL ULCER: ICD-10-CM

## 2025-07-28 RX ORDER — ESTERIFIED ESTROGENS AND METHYLTESTOSTERONE .625; 1.25 MG/1; MG/1
1 TABLET, FILM COATED ORAL DAILY
Qty: 90 TABLET | Refills: 0 | Status: SHIPPED | OUTPATIENT
Start: 2025-07-28

## 2025-07-29 RX ORDER — PANTOPRAZOLE SODIUM 40 MG/1
TABLET, DELAYED RELEASE ORAL
Qty: 180 TABLET | Refills: 3 | Status: SHIPPED | OUTPATIENT
Start: 2025-07-29

## 2025-07-30 DIAGNOSIS — F98.8 ATTENTION DEFICIT DISORDER (ADD) WITHOUT HYPERACTIVITY: ICD-10-CM

## 2025-07-30 DIAGNOSIS — R45.86 MOOD CHANGE: ICD-10-CM

## 2025-07-30 DIAGNOSIS — E03.9 ACQUIRED HYPOTHYROIDISM: ICD-10-CM

## 2025-07-31 RX ORDER — LEVOTHYROXINE SODIUM 75 UG/1
75 TABLET ORAL DAILY
Qty: 90 TABLET | Refills: 3 | Status: SHIPPED | OUTPATIENT
Start: 2025-07-31

## 2025-07-31 RX ORDER — DEXTROAMPHETAMINE SACCHARATE, AMPHETAMINE ASPARTATE, DEXTROAMPHETAMINE SULFATE AND AMPHETAMINE SULFATE 7.5; 7.5; 7.5; 7.5 MG/1; MG/1; MG/1; MG/1
1 TABLET ORAL 2 TIMES DAILY
Qty: 60 TABLET | Refills: 0 | Status: SHIPPED | OUTPATIENT
Start: 2025-07-31

## 2025-07-31 RX ORDER — VENLAFAXINE 37.5 MG/1
37.5 TABLET ORAL DAILY
Qty: 90 TABLET | Refills: 3 | Status: SHIPPED | OUTPATIENT
Start: 2025-07-31

## 2025-08-08 DIAGNOSIS — F98.8 ATTENTION DEFICIT DISORDER (ADD) WITHOUT HYPERACTIVITY: ICD-10-CM

## 2025-08-08 DIAGNOSIS — F51.01 PRIMARY INSOMNIA: ICD-10-CM

## 2025-08-08 DIAGNOSIS — R45.86 MOOD CHANGE: ICD-10-CM

## 2025-08-08 RX ORDER — ZOLPIDEM TARTRATE 12.5 MG/1
12.5 TABLET, FILM COATED, EXTENDED RELEASE ORAL NIGHTLY
Qty: 30 TABLET | Refills: 0 | OUTPATIENT
Start: 2025-08-08

## 2025-08-08 RX ORDER — DEXTROAMPHETAMINE SACCHARATE, AMPHETAMINE ASPARTATE, DEXTROAMPHETAMINE SULFATE AND AMPHETAMINE SULFATE 7.5; 7.5; 7.5; 7.5 MG/1; MG/1; MG/1; MG/1
1 TABLET ORAL 2 TIMES DAILY
Qty: 60 TABLET | Refills: 0 | OUTPATIENT
Start: 2025-08-08

## 2025-08-08 RX ORDER — ESCITALOPRAM OXALATE 20 MG/1
20 TABLET ORAL DAILY
Qty: 30 TABLET | Refills: 0 | OUTPATIENT
Start: 2025-08-08

## 2025-08-08 NOTE — TELEPHONE ENCOUNTER
zolpidem CR (Ambien CR) 12.5 mg ER tablet    amphetamine-dextroamphetamine (Adderall) 30 mg tablet: Take 1 tablet (30 mg) by mouth 2 times a day.     escitalopram (Lexapro) 20 mg tablet.    [id number] - [facility] [id number]

## 2025-08-12 ENCOUNTER — APPOINTMENT (OUTPATIENT)
Dept: PRIMARY CARE | Facility: CLINIC | Age: 70
End: 2025-08-12
Payer: MEDICARE

## 2025-08-13 LAB
BASOPHILS # BLD AUTO: 59 CELLS/UL (ref 0–200)
BASOPHILS NFR BLD AUTO: 0.9 %
EOSINOPHIL # BLD AUTO: 191 CELLS/UL (ref 15–500)
EOSINOPHIL NFR BLD AUTO: 2.9 %
ERYTHROCYTE [DISTWIDTH] IN BLOOD BY AUTOMATED COUNT: 19.4 % (ref 11–15)
HCT VFR BLD AUTO: 37.1 % (ref 35–45)
HGB BLD-MCNC: 10.2 G/DL (ref 11.7–15.5)
LYMPHOCYTES # BLD AUTO: 2231 CELLS/UL (ref 850–3900)
LYMPHOCYTES NFR BLD AUTO: 33.8 %
MCH RBC QN AUTO: 21.7 PG (ref 27–33)
MCHC RBC AUTO-ENTMCNC: 27.5 G/DL (ref 32–36)
MCV RBC AUTO: 79.1 FL (ref 80–100)
MONOCYTES # BLD AUTO: 726 CELLS/UL (ref 200–950)
MONOCYTES NFR BLD AUTO: 11 %
NEUTROPHILS # BLD AUTO: 3392 CELLS/UL (ref 1500–7800)
NEUTROPHILS NFR BLD AUTO: 51.4 %
PLATELET # BLD AUTO: 353 THOUSAND/UL (ref 140–400)
PMV BLD REES-ECKER: 8.9 FL (ref 7.5–12.5)
RBC # BLD AUTO: 4.69 MILLION/UL (ref 3.8–5.1)
WBC # BLD AUTO: 6.6 THOUSAND/UL (ref 3.8–10.8)

## 2025-09-02 DIAGNOSIS — F98.8 ATTENTION DEFICIT DISORDER (ADD) WITHOUT HYPERACTIVITY: ICD-10-CM

## 2025-09-03 RX ORDER — DEXTROAMPHETAMINE SACCHARATE, AMPHETAMINE ASPARTATE, DEXTROAMPHETAMINE SULFATE AND AMPHETAMINE SULFATE 7.5; 7.5; 7.5; 7.5 MG/1; MG/1; MG/1; MG/1
1 TABLET ORAL 2 TIMES DAILY
Qty: 60 TABLET | Refills: 0 | Status: SHIPPED | OUTPATIENT
Start: 2025-09-03